# Patient Record
Sex: MALE | Race: WHITE | URBAN - METROPOLITAN AREA
[De-identification: names, ages, dates, MRNs, and addresses within clinical notes are randomized per-mention and may not be internally consistent; named-entity substitution may affect disease eponyms.]

---
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
## 2018-04-24 ENCOUNTER — OFFICE VISIT (OUTPATIENT)
Dept: FAMILY MEDICINE CLINIC | Age: 18
End: 2018-04-24

## 2018-04-24 VITALS
BODY MASS INDEX: 18.98 KG/M2 | HEART RATE: 78 BPM | HEIGHT: 71 IN | SYSTOLIC BLOOD PRESSURE: 122 MMHG | OXYGEN SATURATION: 97 % | WEIGHT: 135.6 LBS | RESPIRATION RATE: 16 BRPM | DIASTOLIC BLOOD PRESSURE: 73 MMHG | TEMPERATURE: 98.6 F

## 2018-04-24 DIAGNOSIS — R55 NEAR SYNCOPE: Primary | ICD-10-CM

## 2018-04-24 DIAGNOSIS — F42.9 OBSESSIVE-COMPULSIVE DISORDER WITH GOOD OR FAIR INSIGHT: Chronic | ICD-10-CM

## 2018-04-24 NOTE — PROGRESS NOTES
HISTORY OF PRESENT ILLNESS  HPI  Umair Clark is a 21 y.o. Male with a history of OCD, who presents at the office today with his mother for symptoms of syncope. Pt's mother notes that he has had dizziness within the past week and felt like he would pass out. Pt notes that the dizziness started on 4/20 while driving from work for lunch break that lasted 1 minute. Pt admits that he did not eat anything that day. Pt had another episode on 4/21 while into work. For both episodes he mentions having to catch his breath but notes no other symptoms. He denies any headaches. Pt states that he does not eat a lot for breakfast. Both pt and mother note that he does not eat a lot. Pt states that his weight has been stable through the year. Pt is not taking anything that might cause dizziness. Pt is at work from 2-11 and notes that he goes to urinate twice during that time. Pt's mother notes that she has had similar symptoms when she stood up that were due to decreased BP. Past Medical History:   Diagnosis Date    Obsessive-compulsive disorder with good or fair insight 12/22/2016    OCD (obsessive compulsive disorder)      History reviewed. No pertinent surgical history. No current outpatient prescriptions on file prior to visit. No current facility-administered medications on file prior to visit.       No Known Allergies  Family History   Problem Relation Age of Onset    No Known Problems Mother     Diabetes Father     No Known Problems Brother      Social History     Social History    Marital status: SINGLE     Spouse name: N/A    Number of children: N/A    Years of education: N/A     Social History Main Topics    Smoking status: Current Every Day Smoker     Years: 1.00    Smokeless tobacco: Never Used      Comment: Vap    Alcohol use No    Drug use: No    Sexual activity: Not Asked     Other Topics Concern    None     Social History Narrative             Review of Systems   Constitutional: Negative for chills, diaphoresis, fever, malaise/fatigue and weight loss. Loss of consciousness   Eyes: Negative for blurred vision, double vision, pain and redness. Respiratory: Negative for cough, shortness of breath and wheezing. Cardiovascular: Negative for chest pain, palpitations, orthopnea, claudication, leg swelling and PND. Skin: Negative for itching and rash. Neurological: Negative for dizziness, tingling, tremors, sensory change, speech change, focal weakness, seizures, loss of consciousness, weakness and headaches. No results found for this or any previous visit. Physical Exam  Visit Vitals    /73 (BP 1 Location: Left arm, BP Patient Position: Sitting)    Pulse 78    Temp 98.6 °F (37 °C) (Oral)    Resp 16    Ht 5' 10.5\" (1.791 m)    Wt 135 lb 9.6 oz (61.5 kg)    SpO2 97%    BMI 19.18 kg/m2     No orthostatic changes    Head: Normocephalic, without obvious abnormality, atraumatic  Eyes: conjunctivae/corneas clear. PERRL, EOM's intact. Neck: supple, symmetrical, trachea midline, no adenopathy, thyroid: not enlarged, symmetric, no tenderness/mass/nodules, no carotid bruit and no JVD  Lungs: clear to auscultation bilaterally  Heart: regular rate and rhythm, S1, S2 normal, no murmur, click, rub or gallop. No ectopy after listening for 3-4 minutes. Extremities: extremities normal, atraumatic, no cyanosis or edema  Pulses: 2+ and symmetric  Lymph nodes: Cervical, supraclavicular, and axillary nodes normal.  Neurologic: Grossly normal      ASSESSMENT and PLAN    ICD-10-CM ICD-9-CM    1. Near syncope R55 780.2    2. Obsessive-compulsive disorder with good or fair insight F42.9 300.3      Diagnoses and all orders for this visit:    1. Near syncope    2. Obsessive-compulsive disorder with good or fair insight      Follow-up Disposition:  Return if symptoms worsen or fail to improve.        reviewed medications and side effects in detail  Please call my office if there are any questions- [phone]. Discussed expected course/resolution/complications of diagnosis in detail with patient. Medication risks/benefits/costs/interactions/alternatives discussed with patient. Pt was given an after visit summary which includes diagnoses, current medications & vitals. Pt expressed understanding with the diagnosis and plan. Patient to call if no better in 3 -4 days and prn new problems. Total 25 minutes,60 % counseling re: Because this came on two times when he was sitting, it is unlikely to be caused by orthostatic BP changes. Possible that this is due to an arrhythmia and because he does consume a fair amount of caffeine I suggested that he cut back on the caffeine from his 2-3 Dr. Isaac Stage a day to 1. If he has no problems at all for the next month then he should try the Dr. Diandra Webber again at a normal level for him and see if his symptoms return. If his symptoms remain unchanged by his caffeine intake then we will set him for a 24 hour Holter monitor  Also, discussed symptoms of concern that were noted today in the note above, treatment options( including doing nothing), when to follow up before recommended time frame. Also, answered all questions. Long discussion of the causes of syncope or near syncope and that further testing is not indicated at the present time, but would be if it occurs again or he starts having increased palpitations, etc.     He stopped his Zoloft back in January and is functioning OK without it (he is taking it only for OCD, nothing else). He denies any excessive stress related to anything, especially his job or at home, socially , etc. He notes he slept well the night before each of these episodes. This document was written by Waqar Pederson, as dictated by Charley Rod MD.  I have reviewed and agree with the above note and have made corrections where appropriate Nilo John M.D.

## 2018-04-24 NOTE — LETTER
NOTIFICATION RETURN TO WORK / SCHOOL 
 
4/24/2018 5:24 PM 
 
Mr. Juan Salazar 
[address] [place] [address] To Whom It May Concern: 
 
Juan Salazar is currently under the care of [facility]. He will return to work/school on: Wednesday, April 25, 2018 If there are questions or concerns please have the patient contact our office.  
 
 
 
Sincerely, 
 
 
Jose E Townsend MD

## 2018-04-24 NOTE — PROGRESS NOTES
Chief Complaint   Patient presents with    Loss of Consciousness     Patient states that he almost passed out twice while driving. Vision became disoriented, tingling through body, pressure in head. First episode lasted 1 minute and second episode lasted 15 seconds. 1. Have you been to the ER, urgent care clinic since your last visit? Hospitalized since your last visit? No    2. Have you seen or consulted any other health care providers outside of the [address] [place] [address] Michael since your last visit? Include any pap smears or colon screening.  No

## 2019-01-14 ENCOUNTER — OFFICE VISIT (OUTPATIENT)
Dept: FAMILY MEDICINE CLINIC | Age: 19
End: 2019-01-14

## 2019-01-14 VITALS
HEIGHT: 71 IN | SYSTOLIC BLOOD PRESSURE: 120 MMHG | HEART RATE: 75 BPM | BODY MASS INDEX: 17.36 KG/M2 | WEIGHT: 124 LBS | OXYGEN SATURATION: 97 % | DIASTOLIC BLOOD PRESSURE: 77 MMHG | RESPIRATION RATE: 18 BRPM | TEMPERATURE: 98.8 F

## 2019-01-14 DIAGNOSIS — R59.9 LYMPH NODES ENLARGED: Primary | ICD-10-CM

## 2019-01-14 DIAGNOSIS — Z23 ENCOUNTER FOR IMMUNIZATION: ICD-10-CM

## 2019-01-14 DIAGNOSIS — R63.6 LOW WEIGHT: ICD-10-CM

## 2019-01-14 RX ORDER — IBUPROFEN 600 MG/1
600 TABLET ORAL
Qty: 60 TAB | Refills: 0 | Status: SHIPPED | OUTPATIENT
Start: 2019-01-14 | End: 2019-03-11

## 2019-01-14 NOTE — PROGRESS NOTES
HISTORY OF PRESENT ILLNESS Yojana Lazo 19 y.o. male  presents to the office today for evaluation of a swollen lymph node. Blood pressure 120/77, pulse 75, temperature 98.8 °F (37.1 °C), temperature source Oral, resp. rate 18, height 5' 10.5\" (1.791 m), weight 124 lb (56.2 kg), SpO2 97 %. Body mass index is 17.54 kg/m². HPI Swollen Lymph node: Pt reports he has had two notable lymph nodes BL on his groin. He noticed about a week ago that the node on the right side is larger than the node on the left side. Pt denies F/C, fatigue, infections, or weight loss. He endorses cold and sweaty feet occasionally while sleeping. Pt reports his weight has been stable and he has always been unerwight. Health Maintenance: Pt denies having flu shot and agrees to get it today. No Known Allergies Past Medical History:  
Diagnosis Date  Obsessive-compulsive disorder with good or fair insight 12/22/2016  OCD (obsessive compulsive disorder) History reviewed. No pertinent surgical history. Family History Problem Relation Age of Onset  No Known Problems Mother  Diabetes Father  No Known Problems Brother Social History Tobacco Use  Smoking status: Current Every Day Smoker Years: 1.00  Smokeless tobacco: Never Used  Tobacco comment: Vap Substance Use Topics  Alcohol use: No  
  
 
Review of Systems Constitutional: Negative for malaise/fatigue. HENT: Negative for congestion. Eyes: Negative for blurred vision and pain. Respiratory: Negative for cough and shortness of breath. Cardiovascular: Negative for chest pain and palpitations. Gastrointestinal: Negative for abdominal pain and heartburn. Genitourinary: Negative for frequency and urgency. Musculoskeletal: Negative for joint pain and myalgias. Neurological: Negative for dizziness, tingling, sensory change, weakness and headaches. Psychiatric/Behavioral: Negative for depression, memory loss and substance abuse. Physical Exam  
Constitutional: He is oriented to person, place, and time. He appears well-developed and well-nourished. HENT:  
Head: Normocephalic and atraumatic. Right Ear: External ear normal.  
Left Ear: External ear normal.  
Nose: Nose normal.  
Mouth/Throat: Oropharynx is clear and moist.  
Eyes: Conjunctivae and EOM are normal.  
Neck: Normal range of motion. Neck supple. Cardiovascular: Normal rate, regular rhythm, normal heart sounds and intact distal pulses. Pulmonary/Chest: Effort normal and breath sounds normal.  
Abdominal: Soft. Bowel sounds are normal.  
Genitourinary: Testes normal.  
Musculoskeletal: Normal range of motion. Lymphadenopathy:  
     Head (right side): No submental, no submandibular, no tonsillar, no preauricular, no posterior auricular and no occipital adenopathy present. Head (left side): No submental, no submandibular, no tonsillar, no preauricular, no posterior auricular and no occipital adenopathy present. He has no cervical adenopathy. He has no axillary adenopathy. Right: Inguinal adenopathy present. No supraclavicular and no epitrochlear adenopathy present. Left: Inguinal adenopathy present. No supraclavicular and no epitrochlear adenopathy present. Neurological: He is alert and oriented to person, place, and time. Skin: Skin is warm and dry. Psychiatric: He has a normal mood and affect. His behavior is normal. Judgment and thought content normal.  
Nursing note and vitals reviewed. ASSESSMENT and PLAN Diagnoses and all orders for this visit: 1. Lymph nodes enlarged -     METABOLIC PANEL, COMPREHENSIVE 
-     CBC WITH AUTOMATED DIFF 
-     SED RATE (ESR) 
-     US EXT NONVAS RT LTD; Future -     ibuprofen (MOTRIN) 600 mg tablet; Take 1 Tab by mouth every eight (8) hours as needed for Pain. -     Unclear cause, we will check labs and do an ultrasound. Advised if the node should get large or painful to return immediatly 2. Low weight 
     -       Unclear as to cause, Pt my be historically of low weight. I advised Pt to continue to monitor his weight. 3. Encounter for immunization 
-     INFLUENZA VIRUS VAC QUAD,SPLIT,PRESV FREE SYRINGE I 
-     Administered Flu vaccine today in office Follow-up Disposition: 
Return in about 2 weeks (around 1/28/2019) for enlared lymph nodes. Medication risks/benefits/costs/interactions/alternatives discussed with patient. Advised patient to call back or return to office if symptoms worsen/change/persist.  If patient cannot reach us or should anything more severe/urgent arise he/she should proceed directly to the nearest emergency department. Discussed expected course/resolution/complications of diagnosis in detail with patient. Patient given a written after visit summary which includes her diagnoses, current medications and vitals. Patient expressed understanding with the diagnosis and plan. This document was written by Khushi Love, as dictated by Dr. Haylie Burgess MD.  
5:11 PM - 5:29 PM 
Total time spent with the patient 18 minutes, greater than 50% of time spent counseling patient.

## 2019-01-14 NOTE — PATIENT INSTRUCTIONS
Swollen Lymph Nodes: Care Instructions Your Care Instructions Lymph nodes are small, bean-shaped glands throughout the body. They help your body fight germs and infections. Lymph nodes often swell when there is a problem such as an injury, infection, or tumor. · The nodes in your neck, under your chin, or behind your ears may swell when you have a cold or sore throat. · An injury or infection in a leg or foot can make the nodes in your groin swell. · Sometimes medicine can make lymph nodes swell, but this is rare. Treatment depends on what caused your nodes to swell. Usually the nodes return to normal size without a problem. Follow-up care is a key part of your treatment and safety. Be sure to make and go to all appointments, and call your doctor if you are having problems. It's also a good idea to know your test results and keep a list of the medicines you take. How can you care for yourself at home? · Take your medicines exactly as prescribed. Call your doctor if you think you are having a problem with your medicine. · Avoid irritation. ? Do not squeeze or pick at the lump. ? Do not stick a needle in it. · Prevent infection. Do not squeeze, drain, or puncture a painful lump. Doing this can irritate or inflame the lump, push any existing infection deeper into the skin, or cause severe bleeding. · Get extra rest. Slow down just a little from your usual routine. · Drink plenty of fluids, enough so that your urine is light yellow or clear like water. If you have kidney, heart, or liver disease and have to limit fluids, talk with your doctor before you increase the amount of fluids you drink. · Take an over-the-counter pain medicine, such as acetaminophen (Tylenol), ibuprofen (Advil, Motrin), or naproxen (Aleve). Read and follow all instructions on the label. · Do not take two or more pain medicines at the same time unless the doctor told you to.  Many pain medicines have acetaminophen, which is Tylenol. Too much acetaminophen (Tylenol) can be harmful. When should you call for help? Call your doctor now or seek immediate medical care if: 
  · You have worse symptoms of infection, such as: 
? Increased pain, swelling, warmth, or redness. ? Red streaks leading from the area. ? Pus draining from the area. ? A fever.  
 Watch closely for changes in your health, and be sure to contact your doctor if: 
  · Your lymph nodes do not get smaller or do not return to normal.  
  · You do not get better as expected. Where can you learn more? Go to http://kimberly-todd.info/. Enter H891 in the search box to learn more about \"Swollen Lymph Nodes: Care Instructions. \" Current as of: November 18, 2017 Content Version: 11.8 © 0404-8751 [employer]. Care instructions adapted under license by [employer] (which disclaims liability or warranty for this information). If you have questions about a medical condition or this instruction, always ask your healthcare professional. [place] [address] any warranty or liability for your use of this information.

## 2019-01-14 NOTE — PROGRESS NOTES
Chief Complaint Patient presents with  Mass c/o swollen gland, lymph node right x 2 weeks , tender at times Reviewed Record in preparation for visit and have obtained necessary documentation. Identified pt with two pt identifiers (Name @ ) Health Maintenance Due Topic  DTaP/Tdap/Td series (1 - Tdap)  HPV Age 9Y-34Y (1 - Male 3-dose series)  Pneumococcal 19-64 Medium Risk (1 of 1 - PPSV23)  Influenza Age 5 to Adult 1. Have you been to the ER, urgent care clinic since your last visit? Hospitalized since your last visit no 
 
2. Have you seen or consulted any other health care providers outside of the [address] [place] [address] since your last visit? Include any pap smears or colon screening  no

## 2019-01-15 LAB
ALBUMIN SERPL-MCNC: 5.4 G/DL (ref 3.5–5.5)
ALBUMIN/GLOB SERPL: 2.3 {RATIO} (ref 1.2–2.2)
ALP SERPL-CCNC: 79 IU/L (ref 39–117)
ALT SERPL-CCNC: 10 IU/L (ref 0–44)
AST SERPL-CCNC: 17 IU/L (ref 0–40)
BASOPHILS # BLD AUTO: 0 X10E3/UL (ref 0–0.2)
BASOPHILS NFR BLD AUTO: 0 %
BILIRUB SERPL-MCNC: 1.1 MG/DL (ref 0–1.2)
BUN SERPL-MCNC: 10 MG/DL (ref 6–20)
BUN/CREAT SERPL: 13 (ref 9–20)
CALCIUM SERPL-MCNC: 10.6 MG/DL (ref 8.7–10.2)
CHLORIDE SERPL-SCNC: 102 MMOL/L (ref 96–106)
CO2 SERPL-SCNC: 22 MMOL/L (ref 20–29)
CREAT SERPL-MCNC: 0.77 MG/DL (ref 0.76–1.27)
EOSINOPHIL # BLD AUTO: 0.1 X10E3/UL (ref 0–0.4)
EOSINOPHIL NFR BLD AUTO: 1 %
ERYTHROCYTE [DISTWIDTH] IN BLOOD BY AUTOMATED COUNT: 13.3 % (ref 12.3–15.4)
ERYTHROCYTE [SEDIMENTATION RATE] IN BLOOD BY WESTERGREN METHOD: 2 MM/HR (ref 0–15)
GLOBULIN SER CALC-MCNC: 2.4 G/DL (ref 1.5–4.5)
GLUCOSE SERPL-MCNC: 90 MG/DL (ref 65–99)
HCT VFR BLD AUTO: 48.2 % (ref 37.5–51)
HGB BLD-MCNC: 16.9 G/DL (ref 13–17.7)
IMM GRANULOCYTES # BLD AUTO: 0 X10E3/UL (ref 0–0.1)
IMM GRANULOCYTES NFR BLD AUTO: 0 %
LYMPHOCYTES # BLD AUTO: 1.9 X10E3/UL (ref 0.7–3.1)
LYMPHOCYTES NFR BLD AUTO: 36 %
MCH RBC QN AUTO: 32.4 PG (ref 26.6–33)
MCHC RBC AUTO-ENTMCNC: 35.1 G/DL (ref 31.5–35.7)
MCV RBC AUTO: 92 FL (ref 79–97)
MONOCYTES # BLD AUTO: 0.5 X10E3/UL (ref 0.1–0.9)
MONOCYTES NFR BLD AUTO: 9 %
NEUTROPHILS # BLD AUTO: 2.8 X10E3/UL (ref 1.4–7)
NEUTROPHILS NFR BLD AUTO: 54 %
PLATELET # BLD AUTO: 317 X10E3/UL (ref 150–379)
POTASSIUM SERPL-SCNC: 4.4 MMOL/L (ref 3.5–5.2)
PROT SERPL-MCNC: 7.8 G/DL (ref 6–8.5)
RBC # BLD AUTO: 5.22 X10E6/UL (ref 4.14–5.8)
SODIUM SERPL-SCNC: 143 MMOL/L (ref 134–144)
WBC # BLD AUTO: 5.3 X10E3/UL (ref 3.4–10.8)

## 2019-01-16 ENCOUNTER — HOSPITAL ENCOUNTER (OUTPATIENT)
Dept: ULTRASOUND IMAGING | Age: 19
Discharge: HOME | End: 2019-01-16
Payer: COMMERCIAL

## 2019-01-16 DIAGNOSIS — R59.9 LYMPH NODES ENLARGED: ICD-10-CM

## 2019-01-16 PROCEDURE — 76882 US LMTD JT/FCL EVL NVASC XTR: CPT

## 2019-01-17 ENCOUNTER — TELEPHONE (OUTPATIENT)
Dept: FAMILY MEDICINE CLINIC | Age: 19
End: 2019-01-17

## 2019-01-17 NOTE — TELEPHONE ENCOUNTER
Notes recorded by Shekhar Rubin LPN on 8/06/7802 at 3:70 PM EST  Called and patient aware of results. Has f/u apt on 01/28/19. To call immediately if painful or getting larger.

## 2019-01-17 NOTE — PROGRESS NOTES
Please inform pt that he needs to RTO if the swelling does not go down. Impression    IMPRESSION:  1. Palpable adenopathy corresponding to normal appearing lymph nodes. Clinical  management is suggested.  We still have to make sure sure it goes down in size follow up in 2 weeks

## 2019-01-17 NOTE — TELEPHONE ENCOUNTER
Pt. called in today requesting a call back in regards to his x-rays results.      Best call #[phone]

## 2019-01-17 NOTE — PROGRESS NOTES
Called and patient aware of results. Has f/u apt on 01/28/19. To call immediately if painful or getting larger.

## 2019-01-28 ENCOUNTER — OFFICE VISIT (OUTPATIENT)
Dept: FAMILY MEDICINE CLINIC | Age: 19
End: 2019-01-28

## 2019-01-28 VITALS
RESPIRATION RATE: 20 BRPM | BODY MASS INDEX: 17.98 KG/M2 | HEIGHT: 71 IN | TEMPERATURE: 96.1 F | SYSTOLIC BLOOD PRESSURE: 122 MMHG | OXYGEN SATURATION: 94 % | WEIGHT: 128.4 LBS | HEART RATE: 72 BPM | DIASTOLIC BLOOD PRESSURE: 70 MMHG

## 2019-01-28 DIAGNOSIS — R59.9 ENLARGED LYMPH NODES: Primary | ICD-10-CM

## 2019-01-28 NOTE — PROGRESS NOTES
HISTORY OF PRESENT ILLNESS  Lupe Schwab 19 y.o. male  presents to the office today for f/u for enlarged lymph nodes. Blood pressure 122/70, pulse 72, temperature 96.1 °F (35.6 °C), temperature source Oral, resp. rate 20, height 5' 10.5\" (1.791 m), weight 128 lb 6.4 oz (58.2 kg), SpO2 94 %. Body mass index is 18.16 kg/m². Chief Complaint   Patient presents with    Mass     2 weeks follow up visit for enlarge lymph nodes, per patient it has gone down but just a little pain once in awhile      HPI  Enlarged Lymph Node: Pt states that his right inguinal lymph node is still notably larger than the left. He says the pain is slight and feels like pressure but the swelling has gone down since his last visit. The Pt's ultrasound results were normal (Palpable adenopathy, corresponding to normal appearing lymph nodes). Right inguinal lymph nodes were palpated and found to be larger than the left. Pt states he can sometimes feels the left lymph node, but not as much as the right. Pt denies F/C and says his weight has been constant. I referred the Pt to  for a biopsy. Current Outpatient Medications   Medication Sig Dispense Refill    ibuprofen (MOTRIN) 600 mg tablet Take 1 Tab by mouth every eight (8) hours as needed for Pain. 61 Tab 0     No Known Allergies  Past Medical History:   Diagnosis Date    Obsessive-compulsive disorder with good or fair insight 12/22/2016    OCD (obsessive compulsive disorder)      History reviewed. No pertinent surgical history. Family History   Problem Relation Age of Onset    No Known Problems Mother     Diabetes Father     No Known Problems Brother      Social History     Tobacco Use    Smoking status: Current Every Day Smoker     Years: 1.00    Smokeless tobacco: Never Used    Tobacco comment: Vap   Substance Use Topics    Alcohol use: No      Review of Systems   Constitutional: Negative for malaise/fatigue. Eyes: Negative for blurred vision.    Respiratory: Negative for shortness of breath. Cardiovascular: Negative for chest pain and leg swelling. Musculoskeletal: Negative. Neurological: Negative for dizziness and headaches. Physical Exam   Constitutional: He is oriented to person, place, and time. He appears well-developed and well-nourished. No distress. HENT:   Head: Normocephalic and atraumatic. Neck: Normal range of motion. Neck supple. Cardiovascular: Normal rate, regular rhythm, normal heart sounds and intact distal pulses. Exam reveals no gallop and no friction rub. No murmur heard. Pulmonary/Chest: Effort normal and breath sounds normal. No respiratory distress. He has no wheezes. He has no rales. He exhibits no tenderness. Abdominal: Soft. Bowel sounds are normal. He exhibits no distension. There is no tenderness. Musculoskeletal: Normal range of motion. He exhibits no edema. Lymphadenopathy:        Right: Inguinal adenopathy present. Right inguinal lymph nodes were palpated and found to be larger than the left   Neurological: He is alert and oriented to person, place, and time. Skin: He is not diaphoretic. Psychiatric: He has a normal mood and affect. His behavior is normal. Judgment and thought content normal.   Nursing note and vitals reviewed. ASSESSMENT and PLAN  Diagnoses and all orders for this visit:    1. Enlarged lymph nodes  Unlcear as to the cause of his lymph node enlargement, we need a second opinion. I referred the Pt to [address] [place] [address] for an evaluation.  -     REFERRAL TO GENERAL SURGERY    Follow-up Disposition:  Return in about 4 weeks (around 2/25/2019), or if symptoms worsen or fail to improve, for enlarged lymph node. Medication risks/benefits/costs/interactions/alternatives discussed with patient.   Advised patient to call back or return to office if symptoms worsen/change/persist.  If patient cannot reach us or should anything more severe/urgent arise he/she should proceed directly to the nearest emergency department. Discussed expected course/resolution/complications of diagnosis in detail with patient. Patient given a written after visit summary which includes her diagnoses, current medications and vitals. Patient expressed understanding with the diagnosis and plan. This document was written by Risa Ziegler, as dictated by Dr. Almas Harrison MD.   5:14 PM - 5:24 PM  Total time spent with the patient 10 minutes, greater than 50% of time spent counseling patient.

## 2019-01-28 NOTE — PATIENT INSTRUCTIONS
Swollen Lymph Nodes: Care Instructions  Your Care Instructions    Lymph nodes are small, bean-shaped glands throughout the body. They help your body fight germs and infections. Lymph nodes often swell when there is a problem such as an injury, infection, or tumor. · The nodes in your neck, under your chin, or behind your ears may swell when you have a cold or sore throat. · An injury or infection in a leg or foot can make the nodes in your groin swell. · Sometimes medicine can make lymph nodes swell, but this is rare. Treatment depends on what caused your nodes to swell. Usually the nodes return to normal size without a problem. Follow-up care is a key part of your treatment and safety. Be sure to make and go to all appointments, and call your doctor if you are having problems. It's also a good idea to know your test results and keep a list of the medicines you take. How can you care for yourself at home? · Take your medicines exactly as prescribed. Call your doctor if you think you are having a problem with your medicine. · Avoid irritation. ? Do not squeeze or pick at the lump. ? Do not stick a needle in it. · Prevent infection. Do not squeeze, drain, or puncture a painful lump. Doing this can irritate or inflame the lump, push any existing infection deeper into the skin, or cause severe bleeding. · Get extra rest. Slow down just a little from your usual routine. · Drink plenty of fluids, enough so that your urine is light yellow or clear like water. If you have kidney, heart, or liver disease and have to limit fluids, talk with your doctor before you increase the amount of fluids you drink. · Take an over-the-counter pain medicine, such as acetaminophen (Tylenol), ibuprofen (Advil, Motrin), or naproxen (Aleve). Read and follow all instructions on the label. · Do not take two or more pain medicines at the same time unless the doctor told you to.  Many pain medicines have acetaminophen, which is Tylenol. Too much acetaminophen (Tylenol) can be harmful. When should you call for help? Call your doctor now or seek immediate medical care if:    · You have worse symptoms of infection, such as:  ? Increased pain, swelling, warmth, or redness. ? Red streaks leading from the area. ? Pus draining from the area. ? A fever.    Watch closely for changes in your health, and be sure to contact your doctor if:    · Your lymph nodes do not get smaller or do not return to normal.     · You do not get better as expected. Where can you learn more? Go to http://kimberly-todd.info/. Enter J535 in the search box to learn more about \"Swollen Lymph Nodes: Care Instructions. \"  Current as of: July 30, 2018  Content Version: 11.9  © 9626-0192 [employer]. Care instructions adapted under license by [employer] (which disclaims liability or warranty for this information). If you have questions about a medical condition or this instruction, always ask your healthcare professional. [place] [address] any warranty or liability for your use of this information.

## 2019-01-28 NOTE — PROGRESS NOTES
Spoke to patient Identified pt with two pt identifiers (Name @ )    1. Have you been to the ER, urgent care clinic since your last visit? Hospitalized since your last visit? NO    2. Have you seen or consulted any other health care providers outside of the [address] [place] [address] since your last visit? Include any pap smears or colon screening.   NO    \"REVIEWED RECORD IN PREPARATION FOR VISIT AND HAVE OBTAINED NECESSARY DOCUMENTATION\"

## 2019-03-11 ENCOUNTER — OFFICE VISIT (OUTPATIENT)
Dept: URGENT CARE | Age: 19
End: 2019-03-11

## 2019-03-11 VITALS
DIASTOLIC BLOOD PRESSURE: 76 MMHG | TEMPERATURE: 98.7 F | HEART RATE: 69 BPM | BODY MASS INDEX: 18.16 KG/M2 | RESPIRATION RATE: 14 BRPM | OXYGEN SATURATION: 99 % | HEIGHT: 71 IN | SYSTOLIC BLOOD PRESSURE: 152 MMHG

## 2019-03-11 DIAGNOSIS — N45.1 EPIDIDYMITIS, RIGHT: Primary | ICD-10-CM

## 2019-03-11 LAB
BILIRUB UR QL STRIP: NEGATIVE
GLUCOSE UR-MCNC: NEGATIVE MG/DL
KETONES P FAST UR STRIP-MCNC: NEGATIVE MG/DL
PH UR STRIP: 5 [PH] (ref 4.6–8)
PROT UR QL STRIP: NEGATIVE
SP GR UR STRIP: 1.03 (ref 1–1.03)
UA UROBILINOGEN AMB POC: NORMAL (ref 0.2–1)
URINALYSIS CLARITY POC: NORMAL
URINALYSIS COLOR POC: NORMAL
URINE BLOOD POC: NEGATIVE
URINE LEUKOCYTES POC: NEGATIVE
URINE NITRITES POC: NEGATIVE

## 2019-03-11 RX ORDER — CEPHALEXIN 500 MG/1
500 CAPSULE ORAL 4 TIMES DAILY
Qty: 28 CAP | Refills: 0 | Status: SHIPPED | OUTPATIENT
Start: 2019-03-11 | End: 2019-03-18

## 2019-03-11 NOTE — LETTER
[address] [address] [place] [address] 
[phone] Work/School Note Date: 3/11/2019 To Whom It May concern: 
 
Anand Marquez was seen and treated today in the urgent care center by the following provider: Gustavo Moscoso MD 
 
Anand Marquez may return to work on 3/11/19 Sincerely, Isaiah Shepherd

## 2019-03-11 NOTE — PROGRESS NOTES
Testicle Swelling   The history is provided by the patient. This is a new problem. The current episode started more than 2 days ago. The problem occurs daily. The problem has not changed since onset. Primary symptoms include swelling (mild on rt testicle with local pain - sore to pressure). Pertinent negatives include no genital lesions, no penile discharge, no penile pain, no testicular mass and no scrotal pain. There has been no fever. He has tried nothing for the symptoms. Sexual activity: non-contributory. Past Medical History:   Diagnosis Date    Obsessive-compulsive disorder with good or fair insight 12/22/2016    OCD (obsessive compulsive disorder)         No past surgical history on file. Family History   Problem Relation Age of Onset    No Known Problems Mother     Diabetes Father     No Known Problems Brother         Social History     Socioeconomic History    Marital status: SINGLE     Spouse name: Not on file    Number of children: Not on file    Years of education: Not on file    Highest education level: Not on file   Social Needs    Financial resource strain: Not on file    Food insecurity - worry: Not on file    Food insecurity - inability: Not on file   [employer] needs - medical: Not on file   [employer] needs - non-medical: Not on file   Occupational History    Not on file   Tobacco Use    Smoking status: Current Every Day Smoker     Years: 1.00    Smokeless tobacco: Never Used    Tobacco comment: Vap   Substance and Sexual Activity    Alcohol use: No    Drug use: No    Sexual activity: Not on file   Other Topics Concern    Not on file   Social History Narrative    Not on file                ALLERGIES: Patient has no known allergies. Review of Systems   Genitourinary: Negative for penile discharge and penile pain. All other systems reviewed and are negative.       Vitals:    03/11/19 1049   BP: 152/76   Pulse: 69   Resp: 14   Temp: 98.7 °F (37.1 °C)   SpO2: 99%   Height: 5' 10.5\" (1.791 m)       Physical Exam   Abdominal: Normal appearance. He exhibits no shifting dullness and no pulsatile liver. Hernia confirmed negative in the right inguinal area and confirmed negative in the left inguinal area. Genitourinary: Penis normal. Swelling (mild on rt testicle with local pain - sore to pressure) present in the scrotum and/or testes. Right testis shows tenderness (mild on upper pole of testicle). Right testis shows no mass and no swelling. Circumcised. Lymphadenopathy:        Right: No inguinal adenopathy present. Left: No inguinal adenopathy present. Nursing note and vitals reviewed. MDM    Procedures      ICD-10-CM ICD-9-CM    1. Epididymitis, right N45.1 604.90 AMB POC URINALYSIS DIP STICK AUTO W/O MICRO     Use testicular support  Follow with urologist if not resolved    Go to ED if more swelling/ pain   Medications Ordered Today   Medications    cephALEXin (KEFLEX) 500 mg capsule     Sig: Take 1 Cap by mouth four (4) times daily for 7 days. Dispense:  28 Cap     Refill:  0     Results for orders placed or performed in visit on 03/11/19   AMB POC URINALYSIS DIP STICK AUTO W/O MICRO   Result Value Ref Range    Color (UA POC)      Clarity (UA POC)      Glucose (UA POC) Negative Negative    Bilirubin (UA POC) Negative Negative    Ketones (UA POC) Negative Negative    Specific gravity (UA POC) 1.030 1.001 - 1.035    Blood (UA POC) Negative Negative    pH (UA POC) 5.0 4.6 - 8.0    Protein (UA POC) Negative Negative    Urobilinogen (UA POC) 0.2 mg/dL 0.2 - 1    Nitrites (UA POC) Negative Negative    Leukocyte esterase (UA POC) Negative Negative     The patients condition was discussed with the patient and they understand. The patient is to follow up with primary care doctor. If signs and symptoms become worse the pt is to go to the ER. The patient is to take medications as prescribed.

## 2019-03-11 NOTE — PATIENT INSTRUCTIONS
Epididymitis and Orchitis: Care Instructions  Your Care Instructions    Epididymitis is pain and swelling of the tube that is attached to each testicle. This tube is called the epididymis. Orchitis is pain and swelling of the testicle. Infection with bacteria often causes these conditions. Sexually transmitted infections (STIs) also can cause both conditions. This is often the case in men younger than 28. Other causes in boys and older men are infections from surgery or having a catheter that drains urine. The mumps virus also can cause orchitis. Anti-inflammatory or pain medicines can help with the pain. Antibiotics are used if the problem is caused by bacteria. They are not used if a virus is the cause. Your testicle may stay swollen for many days or even a few weeks. The doctor has checked you carefully, but problems can develop later. If you notice any problems or new symptoms, get medical treatment right away. Follow-up care is a key part of your treatment and safety. Be sure to make and go to all appointments, and call your doctor if you are having problems. It's also a good idea to know your test results and keep a list of the medicines you take. How can you care for yourself at home? · If your doctor prescribed antibiotics, take them as directed. Do not stop taking them just because you feel better. You need to take the full course of antibiotics. · Ask your doctor if you can take an over-the-counter pain medicine, such as acetaminophen (Tylenol), ibuprofen (Advil, Motrin), or naproxen (Aleve). Be safe with medicines. Read and follow all instructions on the label. · Limit your activity to what is comfortable. · Wear snug underwear or an athletic supporter. This can help reduce pain. · Apply either cold or heat to the swollen area. Use the one that works best for your pain. Sitting in a warm bath for 15 minutes twice a day will help reduce the swelling more quickly.   · If you have been told that an STI may have caused your condition, do not have sex until your doctor says it is safe. This will prevent spreading the infection. Tell your sex partner or partners that they need to be checked. They may need treatment. When should you call for help? Call your doctor now or seek immediate medical care if:    · Your pain gets worse.     · You have a new or higher fever.     · You have new or more swelling of your testicle.     · You have new belly pain, or your pain gets worse.    Watch closely for changes in your health, and be sure to contact your doctor if:    · You do not get better as expected. Where can you learn more? Go to http://kimberly-todd.info/. Enter S360 in the search box to learn more about \"Epididymitis and Orchitis: Care Instructions. \"  Current as of: March 20, 2018  Content Version: 11.9  © 5554-8593 [employer]. Care instructions adapted under license by "[employer]" (which disclaims liability or warranty for this information). If you have questions about a medical condition or this instruction, always ask your healthcare professional. [address] any warranty or liability for your use of this information.

## 2019-03-13 ENCOUNTER — OFFICE VISIT (OUTPATIENT)
Dept: FAMILY MEDICINE CLINIC | Age: 19
End: 2019-03-13

## 2019-03-13 VITALS
HEART RATE: 68 BPM | WEIGHT: 129 LBS | RESPIRATION RATE: 18 BRPM | TEMPERATURE: 98 F | HEIGHT: 70 IN | BODY MASS INDEX: 18.47 KG/M2 | DIASTOLIC BLOOD PRESSURE: 80 MMHG | SYSTOLIC BLOOD PRESSURE: 123 MMHG | OXYGEN SATURATION: 97 %

## 2019-03-13 DIAGNOSIS — N45.1 EPIDIDYMITIS: ICD-10-CM

## 2019-03-13 DIAGNOSIS — N50.811 TESTICULAR PAIN, RIGHT: Primary | ICD-10-CM

## 2019-03-13 RX ORDER — DOXYCYCLINE 100 MG/1
100 CAPSULE ORAL 2 TIMES DAILY
COMMUNITY
End: 2019-07-19

## 2019-03-13 NOTE — PATIENT INSTRUCTIONS
Testicular Pain: Care Instructions  Your Care Instructions    Pain in the testicles can be caused by many things. These include an injury to your testicles, an infection, and testicular torsion. Injuries and genital problems most often happen during sports or recreational activities, at work, or in a fall. Pain caused by an injury usually goes away quickly. There is usually no long-term harm to your testicles. Infections that may cause pain include:  · An infection of the testicles. This is called orchitis. · An abscess in the scrotum or testicles. · Some sexually transmitted infections (STIs). · A swelling of the tube attached to a testicle. This swelling is called epididymitis. It can cause pain and is sometimes caused by an infection. Testicular torsion happens when a testicle twists on the spermatic cord. This cuts off the blood supply to the testicle. This is a serious condition that requires surgery. Follow-up care is a key part of your treatment and safety. Be sure to make and go to all appointments, and call your doctor if you are having problems. It's also a good idea to know your test results and keep a list of the medicines you take. How can you care for yourself at home? · Rest and protect your testicles and groin. Stop, change, or take a break from any activity that may be causing your pain or soreness. · Put ice or a cold pack on the area for 10 to 20 minutes at a time. Put a thin cloth between the ice and your skin. · Wear briefs, not boxers. Briefs help support the injured area. You can use a jock strap if it helps relieve your pain. · If your doctor prescribed antibiotics, take them as directed. Do not stop taking them just because you feel better. You need to take the full course of antibiotics. · Ask your doctor if you can take an over-the-counter pain medicine, such as acetaminophen (Tylenol), ibuprofen (Advil, Motrin), or naproxen (Aleve). Be safe with medicines.  Read and follow all instructions on the label. · If the doctor gave you a prescription medicine for pain, take it as prescribed. When should you call for help? Call your doctor now or seek immediate medical care if:    · You have severe or increasing pain.     · You notice a change in how your testicles look or are positioned in your scrotum.     · You notice new or worse swelling in your scrotum.     · You have symptoms of a urinary problem, such as a urinary tract infection. These may include:  ? Pain or burning when you urinate. ? A frequent need to urinate without being able to pass much urine. ? Pain in the flank, which is just below the rib cage and above the waist on either side of the back. ? Blood in your urine. ? A fever.    Watch closely for changes in your health, and be sure to contact your doctor if:    · You do not get better as expected. Where can you learn more? Go to http://kimberly-todd.info/. Enter Z496 in the search box to learn more about \"Testicular Pain: Care Instructions. \"  Current as of: March 20, 2018  Content Version: 11.9  © 8418-2548 [employer]. Care instructions adapted under license by [employer] (which disclaims liability or warranty for this information). If you have questions about a medical condition or this instruction, always ask your healthcare professional. [place] [address] any warranty or liability for your use of this information. Epididymitis and Orchitis: Care Instructions  Your Care Instructions    Epididymitis is pain and swelling of the tube that is attached to each testicle. This tube is called the epididymis. Orchitis is pain and swelling of the testicle. Infection with bacteria often causes these conditions. Sexually transmitted infections (STIs) also can cause both conditions. This is often the case in men younger than 28.  Other causes in boys and older men are infections from surgery or having a catheter that drains urine. The mumps virus also can cause orchitis. Anti-inflammatory or pain medicines can help with the pain. Antibiotics are used if the problem is caused by bacteria. They are not used if a virus is the cause. Your testicle may stay swollen for many days or even a few weeks. The doctor has checked you carefully, but problems can develop later. If you notice any problems or new symptoms, get medical treatment right away. Follow-up care is a key part of your treatment and safety. Be sure to make and go to all appointments, and call your doctor if you are having problems. It's also a good idea to know your test results and keep a list of the medicines you take. How can you care for yourself at home? · If your doctor prescribed antibiotics, take them as directed. Do not stop taking them just because you feel better. You need to take the full course of antibiotics. · Ask your doctor if you can take an over-the-counter pain medicine, such as acetaminophen (Tylenol), ibuprofen (Advil, Motrin), or naproxen (Aleve). Be safe with medicines. Read and follow all instructions on the label. · Limit your activity to what is comfortable. · Wear snug underwear or an athletic supporter. This can help reduce pain. · Apply either cold or heat to the swollen area. Use the one that works best for your pain. Sitting in a warm bath for 15 minutes twice a day will help reduce the swelling more quickly. · If you have been told that an STI may have caused your condition, do not have sex until your doctor says it is safe. This will prevent spreading the infection. Tell your sex partner or partners that they need to be checked. They may need treatment. When should you call for help? Call your doctor now or seek immediate medical care if:    · Your pain gets worse.     · You have a new or higher fever.     · You have new or more swelling of your testicle.     · You have new belly pain, or your pain gets worse.  Watch closely for changes in your health, and be sure to contact your doctor if:    · You do not get better as expected. Where can you learn more? Go to http://kimberly-todd.info/. Enter S360 in the search box to learn more about \"Epididymitis and Orchitis: Care Instructions. \"  Current as of: March 20, 2018  Content Version: 11.9  © 7178-4294 [employer]. Care instructions adapted under license by [employer] (which disclaims liability or warranty for this information). If you have questions about a medical condition or this instruction, always ask your healthcare professional. [address] any warranty or liability for your use of this information.

## 2019-03-13 NOTE — PROGRESS NOTES
[facility] Note      Subjective:     Chief Complaint   Patient presents with    Other     swelling and dull pain x 4 days ago, hurts to use the bathroom      Jorge Alberto Mijares is a 19y.o. year old male who presents for evaluation of the following:      Right Testicular Pain  Onset 2 weeks ago, dull aching constant  In past 1 week rigth side tesicla swelling developed. Urgent care diagnosis of epididimytis  Next day urinary dribbling  Went to patient fisrst and told he was dyhydrated from havin the flu (diagnes 1 week ago)  Now urinating with normal stream  Ask me for second opinion and education  Reports a \"odd feeling\" during ejaculation since onset of symptoms  Sexually active with inconsistent condom use. [facility]        Review of Systems   Pertinent positives and negative per HPI. All other systems  reviewed are negative for a Comprehensive ROS (10+).        Past Medical History:   Diagnosis Date    Obsessive-compulsive disorder with good or fair insight 12/22/2016    OCD (obsessive compulsive disorder)         Social History     Socioeconomic History    Marital status: SINGLE     Spouse name: Not on file    Number of children: Not on file    Years of education: Not on file    Highest education level: Not on file   Social Needs    Financial resource strain: Not on file    Food insecurity - worry: Not on file    Food insecurity - inability: Not on file    Transportation needs - medical: Not on file   Prixtel needs - non-medical: Not on file   Occupational History    Not on file   Tobacco Use    Smoking status: Current Every Day Smoker     Years: 1.00    Smokeless tobacco: Never Used    Tobacco comment: Vap   Substance and Sexual Activity    Alcohol use: No    Drug use: No    Sexual activity: Yes     Partners: Female     Birth control/protection: Condom   Other Topics Concern    Not on file   Social History Narrative    Not on file       Current Outpatient Medications   Medication Sig    doxycycline (MONODOX) 100 mg capsule Take 100 mg by mouth two (2) times a day.  cephALEXin (KEFLEX) 500 mg capsule Take 1 Cap by mouth four (4) times daily for 7 days. No current facility-administered medications for this visit. Objective:     Vitals:    03/13/19 1604   BP: 123/80   Pulse: 68   Resp: 18   Temp: 98 °F (36.7 °C)   TempSrc: Oral   SpO2: 97%   Weight: 129 lb (58.5 kg)   Height: 5' 10\" (1.778 m)       Physical Examination:  General: Alert, cooperative, no distress, appears stated age. Eyes: Conjunctivae clear. Mouth/Throat: Lips, mucosa, and tongue normal.   Neck: Supple, symmetrical, trachea midline, no adenopathy. No thyroid enlargement/tenderness/nodules  Respiratory: Breathing comfortably, in no acute respiratory distress. Clear to auscultation bilaterally. Normal inspiratory and expiratory ratio. Cardiovascular: Regular rate and rhythm, S1, S2 normal, no murmur, click, rub or gallop. Extremities with no cyanosis or edema. Pulses 2+ and symmetric radial and DP   Abdomen: Soft, non-tender, non distended. Bowel sounds edmund  : Normal-appearing scrotum, no apparent swelling. Right spermatic cord and epididymis tenderness. Did not tolerate hernia examination. No testicular mass palpated. Left testicle nontender. Exam chaperones by MATTIE Truong  MSK: Extremities normal, atraumatic, no effusion. Gait steady and unassisted. Back symmetric, no curvature. ROM normal. No CVA tenderness. Skin: Skin color, texture, turgor normal. No rashes or lesions on exposed skin. Lymph nodes: Cervical, supraclavicular nodes normal.  Neurologic: CNII-XII intact.    Psychiatric: Appropriate affect      Office Visit on 03/11/2019   Component Date Value Ref Range Status    Glucose (UA POC) 03/11/2019 Negative  Negative Final    Bilirubin (UA POC) 03/11/2019 Negative  Negative Final    Ketones (UA POC) 03/11/2019 Negative  Negative Final    Specific gravity (UA POC) 03/11/2019 1.030  1.001 - 1.035 Final    Blood (UA POC) 03/11/2019 Negative  Negative Final    pH (UA POC) 03/11/2019 5.0  4.6 - 8.0 Final    Protein (UA POC) 03/11/2019 Negative  Negative Final    Urobilinogen (UA POC) 03/11/2019 0.2 mg/dL  0.2 - 1 Final    Nitrites (UA POC) 03/11/2019 Negative  Negative Final    Leukocyte esterase (UA POC) 03/11/2019 Negative  Negative Final   Office Visit on 01/14/2019   Component Date Value Ref Range Status    Glucose 01/14/2019 90  65 - 99 mg/dL Final    BUN 01/14/2019 10  6 - 20 mg/dL Final    Creatinine 01/14/2019 0.77  0.76 - 1.27 mg/dL Final    GFR est non-AA 01/14/2019 131  >59 mL/min/1.73 Final    GFR est AA 01/14/2019 151  >59 mL/min/1.73 Final    BUN/Creatinine ratio 01/14/2019 13  9 - 20 Final    Sodium 01/14/2019 143  134 - 144 mmol/L Final    Potassium 01/14/2019 4.4  3.5 - 5.2 mmol/L Final    Chloride 01/14/2019 102  96 - 106 mmol/L Final    CO2 01/14/2019 22  20 - 29 mmol/L Final    Calcium 01/14/2019 10.6* 8.7 - 10.2 mg/dL Final    Protein, total 01/14/2019 7.8  6.0 - 8.5 g/dL Final    Albumin 01/14/2019 5.4  3.5 - 5.5 g/dL Final    GLOBULIN, TOTAL 01/14/2019 2.4  1.5 - 4.5 g/dL Final    A-G Ratio 01/14/2019 2.3* 1.2 - 2.2 Final    Bilirubin, total 01/14/2019 1.1  0.0 - 1.2 mg/dL Final    Alk.  phosphatase 01/14/2019 79  39 - 117 IU/L Final    AST (SGOT) 01/14/2019 17  0 - 40 IU/L Final    ALT (SGPT) 01/14/2019 10  0 - 44 IU/L Final    WBC 01/14/2019 5.3  3.4 - 10.8 x10E3/uL Final    RBC 01/14/2019 5.22  4.14 - 5.80 x10E6/uL Final    HGB 01/14/2019 16.9  13.0 - 17.7 g/dL Final    HCT 01/14/2019 48.2  37.5 - 51.0 % Final    MCV 01/14/2019 92  79 - 97 fL Final    MCH 01/14/2019 32.4  26.6 - 33.0 pg Final    MCHC 01/14/2019 35.1  31.5 - 35.7 g/dL Final    RDW 01/14/2019 13.3  12.3 - 15.4 % Final    PLATELET 05/17/9972 550  150 - 379 x10E3/uL Final    NEUTROPHILS 01/14/2019 54  Not Estab. % Final  Lymphocytes 01/14/2019 36  Not Estab. % Final    MONOCYTES 01/14/2019 9  Not Estab. % Final    EOSINOPHILS 01/14/2019 1  Not Estab. % Final    BASOPHILS 01/14/2019 0  Not Estab. % Final    ABS. NEUTROPHILS 01/14/2019 2.8  1.4 - 7.0 x10E3/uL Final    Abs Lymphocytes 01/14/2019 1.9  0.7 - 3.1 x10E3/uL Final    ABS. MONOCYTES 01/14/2019 0.5  0.1 - 0.9 x10E3/uL Final    ABS. EOSINOPHILS 01/14/2019 0.1  0.0 - 0.4 x10E3/uL Final    ABS. BASOPHILS 01/14/2019 0.0  0.0 - 0.2 x10E3/uL Final    IMMATURE GRANULOCYTES 01/14/2019 0  Not Estab. % Final    ABS. IMM. GRANS. 01/14/2019 0.0  0.0 - 0.1 x10E3/uL Final    Sed rate (ESR) 01/14/2019 2  0 - 15 mm/hr Final           Assessment/ Plan:   Diagnoses and all orders for this visit:    1. Testicular pain, right  -     CT/NG/T.VAGINALIS AMPLIFICATION  -     URINALYSIS W/ RFLX MICROSCOPIC  -     CULTURE, URINE    2. Epididymitis        Symptoms consistent with epididymitis as previously diagnosed. Continue current doxycycline regimen. Urine testing to evaluate, inform patient these tests may be falsely negative given current antibody cues. Provided education and reassurance. Educated patient on red flag symptoms to warrant return to clinic or emergency room visit. I have discussed the diagnosis with the patient and the intended plan as seen in the above orders. The patient has been offered or received an after-visit summary and questions were answered concerning future plans. I have discussed medication side effects and warnings with the patient as well. Follow-up Disposition:  Return if symptoms worsen or fail to improve.       Signed,    Lora Metcalf MD  3/13/2019

## 2019-03-13 NOTE — PROGRESS NOTES
Chief Complaint   Patient presents with    Other     swelling and dull pain x 4 days ago, hurts to use the bathroom      1. Have you been to the ER, urgent care clinic since your last visit? Hospitalized since your last visit? [facility] swelling and pain in testicles. 2. Have you seen or consulted any other health care providers outside of the [address] [place] [address] since your last visit? Include any pap smears or colon screening. No  [facility] for swelling and pain in testicles.

## 2019-07-19 ENCOUNTER — OFFICE VISIT (OUTPATIENT)
Dept: FAMILY MEDICINE CLINIC | Age: 19
End: 2019-07-19

## 2019-07-19 VITALS
WEIGHT: 133.4 LBS | DIASTOLIC BLOOD PRESSURE: 60 MMHG | BODY MASS INDEX: 19.1 KG/M2 | TEMPERATURE: 97.5 F | SYSTOLIC BLOOD PRESSURE: 110 MMHG | HEIGHT: 70 IN | HEART RATE: 72 BPM | OXYGEN SATURATION: 97 % | RESPIRATION RATE: 18 BRPM

## 2019-07-19 DIAGNOSIS — R09.89 GLOBUS SYNDROME: ICD-10-CM

## 2019-07-19 DIAGNOSIS — F41.1 GENERALIZED ANXIETY DISORDER: Primary | ICD-10-CM

## 2019-07-19 RX ORDER — SERTRALINE HYDROCHLORIDE 50 MG/1
50 TABLET, FILM COATED ORAL DAILY
Qty: 30 TAB | Refills: 1 | Status: SHIPPED | OUTPATIENT
Start: 2019-07-19 | End: 2019-08-02

## 2019-07-19 NOTE — PATIENT INSTRUCTIONS
Learning About Generalized Anxiety Disorder  What is generalized anxiety disorder? We all worry. It's a normal part of life. But when you have generalized anxiety disorder, you worry about lots of things and have a hard time stopping your worry. This worry or anxiety interferes with your relationships, work, and life. What causes it? The cause is not known. But it may be passed down through families. What are the symptoms? You may feel anxious or worry most days about things like work, relationships, health, or money. You may worry about things that are unlikely to happen. You find it hard to stop or control the worry. Because you worry a lot and try hard to stop worrying, you may feel restless, tired, tense, or cranky. You may also find it hard to think or sleep. And you may have headaches or an upset stomach. How is it diagnosed? Your doctor will ask about your health and how often you worry or feel anxious. He or she may ask about other symptoms, like whether you:  · Feel restless. · Feel tired. · Have a hard time thinking or feel that your mind goes blank. · Feel cranky. · Have tense muscles. · Have sleep problems. A physical exam and tests can help make sure that your symptoms aren't caused by a different condition, such as a thyroid problem. How is it treated? Counseling and medicine can both work to treat anxiety. The two are often used along with lifestyle changes. Cognitive-behavioral therapy (CBT) is a type of counseling that's used to help treat anxiety. In CBT, you learn how to notice and replace thoughts that make you feel worried. It also can help you learn how to relax when you worry. Medicines can help. These medicines are often also used for depression. Selective serotonin reuptake inhibitors (SSRIs) are often tried first. But there are other medicines that your doctor may use. You may need to try a few medicines to find one that works well.   Many people feel better by getting regular exercise, eating healthy meals, and getting good sleep. Mindfulnessfocusing on things that happen in the present momentalso can help reduce your anxiety. What can you expect when you have it? Having anxiety can be upsetting. Some people might feel less worried and stressed after a couple of months of treatment. But for other people, it might take longer to feel better. Reaching out to people for help is important. Try not to isolate yourself. Let your family and friends help you. Find someone you can trust and confide in. Talk to that person. When you know what anxiety isand how you can get help for ityou can start to learn new ways of thinking. This can help you cope and work through your anxiety. Follow-up care is a key part of your treatment and safety. Be sure to make and go to all appointments, and call your doctor if you are having problems. It's also a good idea to know your test results and keep a list of the medicines you take. Where can you learn more? Go to http://kimberly-todd.info/. Enter G110 in the search box to learn more about \"Learning About Generalized Anxiety Disorder. \"  Current as of: September 11, 2018  Content Version: 11.9  © 8625-6407 [employer]. Care instructions adapted under license by [employer] (which disclaims liability or warranty for this information). If you have questions about a medical condition or this instruction, always ask your healthcare professional. [place] [address] any warranty or liability for your use of this information. Learning About a Lump in the Throat  What is a lump in the throat? The condition that people refer to as a \"lump in the throat\" is a feeling that an object is stuck in your throat. It's also called globus (say \"GLOH-bus\"). You don't really have anything stuck there. But the feeling is real, and it can be uncomfortable.   This feeling may be there all the time, or it may happen now and then. It's not painful. The lump is felt in the front of the throat. It may feel like it moves up and down when you swallow. It's not clear where this feeling in the throat comes from. Gastroesophageal reflux disease (GERD) may contribute to it. Reflux means that stomach acid and juices flow from the stomach back up into the tube that leads from the throat to the stomach. (This tube is called the esophagus.) The lump in the throat may have other causes. These include problems with swallowing, as well as muscle spasms in the esophagus. It may also be felt as a result of stress or an anxiety disorder. In some cases, the feeling goes away by itself over time. How is it treated? The doctor will first examine you to try to find the cause of the feeling of the lump in your throat. He or she may give you medicine to reduce stomach acid to see if it helps relieve the lump sensation. If anxiety is a factor, the doctor may work with you to address it. If you still feel like there's a lump in your throat, your doctor may give you a variety of other tests. He or she may examine your throat, neck, voice box (pharynx), and esophagus to see if you have any blockage. Your doctor may use a thin, flexible tube, called a scope, to look deep into your throat. This is called a laryngoscopy. Or you may have a swallowing study that uses X-rays to film your throat as you swallow. If the tests find a problem that can be treated, your doctor will treat you for it. You may have speech therapy to learn how to relax the muscles of your throat or swallow differently. And just finding out that there's no physical cause for the lump in your throat may help you feel better. How can you care for yourself at home? · Try not to clear your throat or swallow too often. · Have something to eat or drink. This may give you some relief for a while. · Try to relax and reduce stress.  Relaxation techniques such as deep breathing or meditation may help. Follow-up care is a key part of your treatment and safety. Be sure to make and go to all appointments, and call your doctor if you are having problems. It's also a good idea to know your test results and keep a list of the medicines you take. Where can you learn more? Go to http://kimberly-todd.info/. Enter J070 in the search box to learn more about \"Learning About a Lump in the Throat. \"  Current as of: March 27, 2018  Content Version: 11.9  © 8859-6609 [employer]. Care instructions adapted under license by [employer] (which disclaims liability or warranty for this information). If you have questions about a medical condition or this instruction, always ask your healthcare professional. [address] any warranty or liability for your use of this information.

## 2019-07-19 NOTE — PROGRESS NOTES
Chief Complaint   Patient presents with    Anxiety     x 3 months, feels like lump in throat     HISTORY OF PRESENT ILLNESS   HPI  My first time meeting this usual patient of Dr. Jil Rodriguez who presents initially complaining of sensation of a \"lump in his throat\" the past 3-4 months. Not daily and only intermittently throughout the day. Worse when he is anxious or stressed. Started working at new job at [employer] 4 months ago. Likes his job but wonders if he has been a bit more stressed since starting there. He was nervous when he started training there 4months ago and seems to have carried over. Also still feels the lump in throat and some anxiety even unrelated to work at times. He denies throat or neck pain, hoarseness, dysphagia, odynophagia, cough, post nasal drainage, throat scratchiness or itchiness, SOB, choking, throat closing, acid reflux, abdominal pain, nausea or vomiting. He has no trouble eating, drinking or swallowing. The sensation actually gets better when he drinks water or takes in a deep breath and relaxes. Feels it is all related to his nerves but wanted to be sure. He vapes/does E-cigs/jewels. Was up to ~ 4-6 pods a week but stopped altogether 1 week ago and has already noticed some improvement in the throat sensation. Wasn't sure if that was just \"all in his head\" or not. But since stopping the vaping, his anxiety has gotten worse. He does report being dx w/ OCD when testing during middle school. He recalls taking Zoloft for about 1-2 years which worked really well and finally stopped it because he was feeling so much better. He denies those ruminating/obsessive thoughts he used to have. But states he is generally an kun, nervous type person. CLARISA 7 7/19/2019   Over the past 2 weeks how often have you felt nervous, anxious, or on edge? 2   Over the past 2 weeks how often have you not been able to stop or control worrying?  3   In the past 2 weeks how often have you worried too much about different things? 3   Over the past 2 weeks, how often have you had trouble relaxing? 3   Over the last 2 weeks how often have you been so restless that it is hard to sit still? 2   Over the last 2 weeks how often have you been easily annoyed or irritable? 2   Over the last 2 weeks, how often have you felt afraid as if something awful might happen? 0   BSHSI AMB CLARISA-7 SCORE 15   If your score is 1 or more, how difficult have these made it for you to do your work, take care of things at home. or get along with people? Somewhat difficult          REVIEW OF SYMPTOMS   Review of Systems   Constitutional: Negative. Negative for chills, fever, malaise/fatigue and weight loss. HENT: Negative for sore throat. Respiratory: Negative. Negative for cough, shortness of breath and wheezing. Cardiovascular: Negative. Negative for chest pain. Gastrointestinal: Negative for abdominal pain, heartburn, nausea and vomiting. Neurological: Negative. Psychiatric/Behavioral: Negative for depression. The patient is nervous/anxious. The patient does not have insomnia. PROBLEM LIST/MEDICAL HISTORY     Problem List  Date Reviewed: 7/19/2019          Codes Class Noted    Obsessive-compulsive disorder with good or fair insight (Chronic) ICD-10-CM: F42.9  ICD-9-CM: 300.3  12/22/2016                  PAST SURGICAL HISTORY   History reviewed. No pertinent surgical history. MEDICATIONS     No current outpatient medications on file. No current facility-administered medications for this visit.            ALLERGIES   No Known Allergies       SOCIAL HISTORY     Social History     Socioeconomic History    Marital status: SINGLE     Spouse name: Not on file    Number of children: Not on file    Years of education: Not on file    Highest education level: Not on file   Occupational History    Occupation: Collections at [employer]   Tobacco Use    Smoking status: Current Some Day Smoker     Years: 1.00    Smokeless tobacco: Never Used    Tobacco comment: Vap/E-Cigs ~ a few pods a week   Substance and Sexual Activity    Alcohol use: No    Drug use: No    Sexual activity: Yes     Partners: Female     Birth control/protection: Condom   Other Topics Concern    Caffeine Concern Yes     Comment: 2 bottles of 20 oz Dr. Hank Rivero per day        IMMUNIZATIONS  Immunization History   Administered Date(s) Administered    Influenza Vaccine (Quad) PF 01/14/2019         FAMILY HISTORY     Family History   Problem Relation Age of Onset    No Known Problems Mother     Diabetes Father     No Known Problems Brother          VITALS     Visit Vitals  /60 (BP 1 Location: Right arm, BP Patient Position: Sitting)   Pulse 72   Temp 97.5 °F (36.4 °C) (Oral)   Resp 18   Ht 5' 10\" (1.778 m)   Wt 133 lb 6.4 oz (60.5 kg)   SpO2 97%   BMI 19.14 kg/m²          PHYSICAL EXAMINATION   Physical Exam   Constitutional: He is oriented to person, place, and time and well-developed, well-nourished, and in no distress. HENT:   Right Ear: Tympanic membrane normal.   Left Ear: Tympanic membrane normal.   Mouth/Throat: Oropharynx is clear and moist. No oropharyngeal exudate. Neck: Neck supple. No thyromegaly present. Cardiovascular: Regular rhythm and normal heart sounds. No murmur heard. Pulmonary/Chest: Effort normal and breath sounds normal.   Lymphadenopathy:     He has no cervical adenopathy. Neurological: He is alert and oriented to person, place, and time. Skin: Skin is warm and dry. Psychiatric: Judgment normal. His mood appears anxious. His affect is not inappropriate. He does not exhibit a depressed mood. He exhibits ordered thought content. Very pleasant young man, fidgety and bounces legs during most of the visit, slightly racy speech but very appropriate   Vitals reviewed. ASSESSMENT & PLAN       ICD-10-CM ICD-9-CM    1.  Generalized anxiety disorder (CLARISA& Score high) F41.1 300.02 sertraline (ZOLOFT) 50 mg tablet   2. 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   Counseling about globus syndrome at length w/ pt in office today to include nature of symptoms, potential causes, mgt  If the globus symptoms do not continue to improve, will have him see ENT  Otherwise follow up w/ PCP in ~ 6 weeks, sooner prn  Spent >50% of today's 30 min enc counseling/education

## 2019-07-19 NOTE — PROGRESS NOTES
Chief Complaint   Patient presents with    Anxiety     x 3 months, feels like lump in throat       1. Have you been to the ER, urgent care clinic since your last visit? Hospitalized since your last visit? No    2. Have you seen or consulted any other health care providers outside of the [address] [place] [address] since your last visit? Include any pap smears or colon screening.  No

## 2019-08-02 ENCOUNTER — OFFICE VISIT (OUTPATIENT)
Dept: FAMILY MEDICINE CLINIC | Age: 19
End: 2019-08-02

## 2019-08-02 VITALS
HEART RATE: 80 BPM | RESPIRATION RATE: 16 BRPM | SYSTOLIC BLOOD PRESSURE: 123 MMHG | HEIGHT: 70 IN | BODY MASS INDEX: 18.75 KG/M2 | WEIGHT: 131 LBS | TEMPERATURE: 97.6 F | DIASTOLIC BLOOD PRESSURE: 71 MMHG | OXYGEN SATURATION: 95 %

## 2019-08-02 DIAGNOSIS — K59.00 CONSTIPATION, UNSPECIFIED CONSTIPATION TYPE: ICD-10-CM

## 2019-08-02 DIAGNOSIS — N41.0 ACUTE PROSTATITIS: ICD-10-CM

## 2019-08-02 DIAGNOSIS — M54.42 ACUTE MIDLINE LOW BACK PAIN WITH LEFT-SIDED SCIATICA: Primary | ICD-10-CM

## 2019-08-02 RX ORDER — SULFAMETHOXAZOLE AND TRIMETHOPRIM 800; 160 MG/1; MG/1
TABLET ORAL
COMMUNITY
Start: 2019-07-27 | End: 2020-06-17 | Stop reason: ALTCHOICE

## 2019-08-02 RX ORDER — AZITHROMYCIN 500 MG/1
TABLET, FILM COATED ORAL
COMMUNITY
Start: 2019-07-26 | End: 2019-08-02

## 2019-08-02 NOTE — PROGRESS NOTES
Chief Complaint   Patient presents with    Numbness     left leg x 2 days     Constipation     x 2 days      1. Have you been to the ER, urgent care clinic since your last visit? Hospitalized since your last visit? Yes [facility] 1 week ago for prostatitis      2. Have you seen or consulted any other health care providers outside of the [address] [place] [address] since your last visit? Include any pap smears or colon screening.  No

## 2019-08-02 NOTE — PATIENT INSTRUCTIONS
Motrin 600-800 mg Q 6 hours for low back pain. Drink plenty of water. Complete antibiotic therapy   Take Miralax 1-2 times daily as needed for constipation. Low Back Pain: Exercises  Introduction  Here are some examples of exercises for you to try. The exercises may be suggested for a condition or for rehabilitation. Start each exercise slowly. Ease off the exercises if you start to have pain. You will be told when to start these exercises and which ones will work best for you. How to do the exercises  Press-up    1. Lie on your stomach, supporting your body with your forearms. 2. Press your elbows down into the floor to raise your upper back. As you do this, relax your stomach muscles and allow your back to arch without using your back muscles. As your press up, do not let your hips or pelvis come off the floor. 3. Hold for 15 to 30 seconds, then relax. 4. Repeat 2 to 4 times. Alternate arm and leg (bird dog) exercise    1. Start on the floor, on your hands and knees. 2. Tighten your belly muscles. 3. Raise one leg off the floor, and hold it straight out behind you. Be careful not to let your hip drop down, because that will twist your trunk. 4. Hold for about 6 seconds, then lower your leg and switch to the other leg. 5. Repeat 8 to 12 times on each leg. 6. Over time, work up to holding for 10 to 30 seconds each time. 7. If you feel stable and secure with your leg raised, try raising the opposite arm straight out in front of you at the same time. Knee-to-chest exercise    1. Lie on your back with your knees bent and your feet flat on the floor. 2. Bring one knee to your chest, keeping the other foot flat on the floor (or keeping the other leg straight, whichever feels better on your lower back). 3. Keep your lower back pressed to the floor. Hold for at least 15 to 30 seconds. 4. Relax, and lower the knee to the starting position. 5. Repeat with the other leg.  Repeat 2 to 4 times with each leg. 6. To get more stretch, put your other leg flat on the floor while pulling your knee to your chest.    Curl-ups    1. Lie on the floor on your back with your knees bent at a 90-degree angle. Your feet should be flat on the floor, about 12 inches from your buttocks. 2. Cross your arms over your chest. If this bothers your neck, try putting your hands behind your neck (not your head), with your elbows spread apart. 3. Slowly tighten your belly muscles and raise your shoulder blades off the floor. 4. Keep your head in line with your body, and do not press your chin to your chest.  5. Hold this position for 1 or 2 seconds, then slowly lower yourself back down to the floor. 6. Repeat 8 to 12 times. Pelvic tilt exercise    1. Lie on your back with your knees bent. 2. \"Brace\" your stomach. This means to tighten your muscles by pulling in and imagining your belly button moving toward your spine. You should feel like your back is pressing to the floor and your hips and pelvis are rocking back. 3. Hold for about 6 seconds while you breathe smoothly. 4. Repeat 8 to 12 times. Heel dig bridging    1. Lie on your back with both knees bent and your ankles bent so that only your heels are digging into the floor. Your knees should be bent about 90 degrees. 2. Then push your heels into the floor, squeeze your buttocks, and lift your hips off the floor until your shoulders, hips, and knees are all in a straight line. 3. Hold for about 6 seconds as you continue to breathe normally, and then slowly lower your hips back down to the floor and rest for up to 10 seconds. 4. Do 8 to 12 repetitions. Hamstring stretch in doorway    1. Lie on your back in a doorway, with one leg through the open door. 2. Slide your leg up the wall to straighten your knee. You should feel a gentle stretch down the back of your leg. 3. Hold the stretch for at least 15 to 30 seconds.  Do not arch your back, point your toes, or bend either knee. Keep one heel touching the floor and the other heel touching the wall. 4. Repeat with your other leg. 5. Do 2 to 4 times for each leg. Hip flexor stretch    1. Kneel on the floor with one knee bent and one leg behind you. Place your forward knee over your foot. Keep your other knee touching the floor. 2. Slowly push your hips forward until you feel a stretch in the upper thigh of your rear leg. 3. Hold the stretch for at least 15 to 30 seconds. Repeat with your other leg. 4. Do 2 to 4 times on each side. Wall sit    1. Stand with your back 10 to 12 inches away from a wall. 2. Lean into the wall until your back is flat against it. 3. Slowly slide down until your knees are slightly bent, pressing your lower back into the wall. 4. Hold for about 6 seconds, then slide back up the wall. 5. Repeat 8 to 12 times. Follow-up care is a key part of your treatment and safety. Be sure to make and go to all appointments, and call your doctor if you are having problems. It's also a good idea to know your test results and keep a list of the medicines you take. Where can you learn more? Go to http://kimberly-todd.info/. Enter M974 in the search box to learn more about \"Low Back Pain: Exercises. \"  Current as of: September 20, 2018  Content Version: 12.1  © 8465-9420 [employer]. Care instructions adapted under license by [employer] (which disclaims liability or warranty for this information). If you have questions about a medical condition or this instruction, always ask your healthcare professional. [address] any warranty or liability for your use of this information.

## 2019-08-02 NOTE — PROGRESS NOTES
[facility] Note  Subjective:      Lona Mendoza is a 19 y.o. male who presents for an acute visit with the following chief complaints. Chief Complaint   Patient presents with    Numbness     left leg x 2 days     Constipation     x 2 days        Reports recent history of prostatitis a few weeks ago. He had symptoms of increased frequency, dysuria discomfort with ejaculation and tightness of the area just behind scrotum. Evaluated at [facility] last week and was started on Bactrim antibiotic therapy. Reports STI testing was negative 1 week prior to onset of these symptoms at work clinic. Since this time he report improvement of urinary symptoms. He reports constipation. He was  [facility] for this with recommendations of Miralax  He took Miralax with prune juice with relief. But constipation occurred again this morning. Mild rectal discomfort with straining. 2 days ago he notes paresthesia of the left leg. Tingling of the left foot, and occasionally of quadricept. Yesterday he notes mild low back pain. Present currently, mild aching. Current Outpatient Medications   Medication Sig Dispense Refill    trimethoprim-sulfamethoxazole (BACTRIM DS, SEPTRA DS) 160-800 mg per tablet        No Known Allergies    ROS:   Complete review of systems was reviewed with pertinent information listed in HPI. Review of Systems   Constitutional: Negative for chills, diaphoresis, fever, malaise/fatigue and weight loss. HENT: Negative for congestion and sore throat. Eyes: Negative for blurred vision and double vision. Respiratory: Negative for cough. Cardiovascular: Negative for chest pain and palpitations. Gastrointestinal: Positive for constipation. Negative for abdominal pain, blood in stool, diarrhea, heartburn, melena, nausea and vomiting. Genitourinary: Negative for dysuria, flank pain, frequency, hematuria and urgency. Musculoskeletal: Positive for back pain. Negative for joint pain and myalgias. Skin: Negative for itching and rash. Neurological: Positive for tingling. Negative for dizziness, tremors, sensory change, speech change, focal weakness, weakness and headaches. Psychiatric/Behavioral: Negative. Objective:     Visit Vitals  /71 (BP 1 Location: Right arm, BP Patient Position: Sitting)   Pulse 80   Temp 97.6 °F (36.4 °C) (Oral)   Resp 16   Ht 5' 10\" (1.778 m)   Wt 131 lb (59.4 kg)   SpO2 95%   BMI 18.80 kg/m²       Vitals and Nurse Documentation reviewed. Physical Exam   Constitutional: He is oriented to person, place, and time and well-developed, well-nourished, and in no distress. HENT:   Head: Normocephalic and atraumatic. Eyes: Pupils are equal, round, and reactive to light. Conjunctivae and EOM are normal.   Neck: Normal range of motion. Neck supple. Carotid bruit is not present. Cardiovascular: Normal rate, regular rhythm, normal heart sounds and intact distal pulses. Exam reveals no gallop and no friction rub. No murmur heard. Pulmonary/Chest: Effort normal and breath sounds normal. No respiratory distress. He has no wheezes. He has no rales. He exhibits no tenderness. Abdominal: Soft. Normal appearance and bowel sounds are normal. He exhibits no distension and no mass. There is no hepatosplenomegaly. There is no tenderness. There is no rebound, no guarding and no CVA tenderness. No hernia. Hernia confirmed negative in the right inguinal area and confirmed negative in the left inguinal area. Genitourinary: Rectum normal. Rectal exam shows no external hemorrhoid, no internal hemorrhoid, no fissure, no laceration, no mass and no tenderness. Musculoskeletal: Normal range of motion. He exhibits no edema, tenderness or deformity.         Lumbar back: He exhibits normal range of motion, no tenderness, no bony tenderness, no swelling, no edema, no deformity, no laceration, no pain and no spasm. Back:    Negative straight leg raise bilaterally. No saddle anesthesia. Lymphadenopathy:     He has no cervical adenopathy. Neurological: He is alert and oriented to person, place, and time. He has normal sensation, normal strength, normal reflexes and intact cranial nerves. Gait normal. Gait normal.   Reflex Scores:       Patellar reflexes are 2+ on the right side and 2+ on the left side. Achilles reflexes are 2+ on the right side and 2+ on the left side. Skin: Skin is warm and dry. He is not diaphoretic. Psychiatric: Mood, memory, affect and judgment normal.   Nursing note and vitals reviewed. Assessment/Plan:     Diagnoses and all orders for this visit:    1. Acute midline low back pain with left-sided sciatica: Acute nontraumatic low back pain. No neurovascular deficits on exam. No redflags. Start conservative therapy, NSAID's, alternate heat/ice, home stretches. 2. Acute prostatitis: Diagn[facility] at [facility] week, clinically resolving. Continue antibiotic therapy. 3. Constipation, unspecified constipation type: Also an acute issue. Drink plenty of water. Take Miralax 1-2 times daily as needed for constipation. Diet and lifestyle modifications reviewed. RTC if symptoms worsen or do not resolve. Follow-up and Dispositions    · Return in about 6 weeks (around 9/13/2019), or if symptoms worsen or fail to improve.          Discussed expected course/resolution/complications of diagnosis in detail with patient.    Medication risks/benefits/costs/interactions/alternatives discussed with patient.    Pt was given an after visit summary which includes diagnoses, current medications & vitals.  Pt expressed understanding with the diagnosis and plan

## 2019-09-06 ENCOUNTER — OFFICE VISIT (OUTPATIENT)
Dept: FAMILY MEDICINE CLINIC | Age: 19
End: 2019-09-06

## 2019-09-06 VITALS
OXYGEN SATURATION: 98 % | TEMPERATURE: 98 F | WEIGHT: 133 LBS | HEART RATE: 87 BPM | DIASTOLIC BLOOD PRESSURE: 72 MMHG | RESPIRATION RATE: 16 BRPM | HEIGHT: 70 IN | SYSTOLIC BLOOD PRESSURE: 130 MMHG | BODY MASS INDEX: 19.04 KG/M2

## 2019-09-06 DIAGNOSIS — Z13.220 SCREENING, LIPID: ICD-10-CM

## 2019-09-06 DIAGNOSIS — R07.89 ATYPICAL CHEST PAIN: Primary | ICD-10-CM

## 2019-09-06 DIAGNOSIS — F41.1 GAD (GENERALIZED ANXIETY DISORDER): ICD-10-CM

## 2019-09-06 PROBLEM — N41.1 CHRONIC PROSTATITIS: Status: ACTIVE | Noted: 2019-09-06

## 2019-09-06 NOTE — PROGRESS NOTES
Chief Complaint   Patient presents with    Shortness of Breath     x 3-4 days     Chest Pain     pain with taking deep breath     Cough     dry      1. Have you been to the ER, urgent care clinic since your last visit? Hospitalized since your last visit? No    2. Have you seen or consulted any other health care providers outside of the [address] [place] [address] since your last visit? Include any pap smears or colon screening.  No

## 2019-09-06 NOTE — PROGRESS NOTES
Assessment/Plan:     Diagnoses and all orders for this visit:    1. Atypical chest pain  -     AMB POC EKG ROUTINE W/ 12 LEADS, INTER & REP  -     METABOLIC PANEL, COMPREHENSIVE  -     CBC WITH AUTOMATED DIFF  EKG without acute ischemic changes. Continue to monitor and follow-up if symptoms persist.  Labs pending. 2. Screening, lipid  -     LIPID PANEL    3. CLARISA (generalized anxiety disorder)  -     TSH 3RD GENERATION  I have offered counseling services which she declines at this time. CLARISA 7 equals 14. He states understanding that SSRI therapy is an option for treatment and he declines at this time. He will follow-up on an as-needed basis. Follow-up and Dispositions    · Return in about 1 year (around 9/6/2020) for Complete Physical.         Discussed expected course/resolution/complications of diagnosis in detail with patient. Medication risks/benefits/costs/interactions/alternatives discussed with patient. Pt was given after visit summary which includes diagnoses, current medications & vitals. Pt expressed understanding with the diagnosis and plan          Subjective:      Gianluca Friday is a 22 y.o. male who presents for had concerns including Shortness of Breath (x 3-4 days ); Chest Pain (pain with taking deep breath ); and Cough (dry ). \"Pamela\"    Reports a one week of shortness of breath and intermittent chest pain. Reports a one day history of cough. He is not currently taking any OTC treatment. He denies contacts with similar symptoms. Denies fever, chills, body aches. This is his first evaluation. He reports a history of generalized anxiety disorder. He has not previously attempted treatment. He feels symptoms are stable at this time. He is currently working at [employer]. He did some classes at [employer].          Patient Active Problem List   Diagnosis Code    Obsessive-compulsive disorder with good or fair insight F42.9    Generalized anxiety disorder F41.1    Chronic prostatitis N41.1       Current Outpatient Medications   Medication Sig Dispense Refill    trimethoprim-sulfamethoxazole (BACTRIM DS, SEPTRA DS) 160-800 mg per tablet          No Known Allergies    ROS:   Review of Systems   Constitutional: Negative for chills, fever and malaise/fatigue. HENT: Negative for congestion, ear pain, sinus pain and sore throat. Respiratory: Positive for cough and shortness of breath. Negative for sputum production and wheezing. Cardiovascular: Positive for chest pain. Neurological: Negative for seizures. Endo/Heme/Allergies: Negative for environmental allergies. Psychiatric/Behavioral: The patient is nervous/anxious. Objective:     Visit Vitals  /72   Pulse 87   Temp 98 °F (36.7 °C) (Oral)   Resp 16   Ht 5' 10\" (1.778 m)   Wt 133 lb (60.3 kg)   SpO2 98%   BMI 19.08 kg/m²       Vitals and Nurse Documentation reviewed. Physical Exam   Constitutional: No distress. HENT:   Right Ear: Tympanic membrane is not erythematous and not bulging. No middle ear effusion. Left Ear: Tympanic membrane is not erythematous and not bulging. No middle ear effusion. Nose: No rhinorrhea. Right sinus exhibits no maxillary sinus tenderness and no frontal sinus tenderness. Left sinus exhibits no maxillary sinus tenderness and no frontal sinus tenderness. Mouth/Throat: No oropharyngeal exudate or posterior oropharyngeal erythema. Eyes: EOM and lids are normal.   Cardiovascular: S1 normal and S2 normal. Exam reveals no gallop and no friction rub. No murmur heard. Pulmonary/Chest: Breath sounds normal. He has no wheezes. Lymphadenopathy:     He has no cervical adenopathy. Skin: Skin is warm and dry. Psychiatric: Affect normal. His mood appears anxious.

## 2019-09-07 LAB
ALBUMIN SERPL-MCNC: 4.9 G/DL (ref 3.5–5.5)
ALBUMIN/GLOB SERPL: 2.2 {RATIO} (ref 1.2–2.2)
ALP SERPL-CCNC: 75 IU/L (ref 39–117)
ALT SERPL-CCNC: 14 IU/L (ref 0–44)
AST SERPL-CCNC: 16 IU/L (ref 0–40)
BASOPHILS # BLD AUTO: 0 X10E3/UL (ref 0–0.2)
BASOPHILS NFR BLD AUTO: 1 %
BILIRUB SERPL-MCNC: 0.7 MG/DL (ref 0–1.2)
BUN SERPL-MCNC: 11 MG/DL (ref 6–20)
BUN/CREAT SERPL: 11 (ref 9–20)
CALCIUM SERPL-MCNC: 10.3 MG/DL (ref 8.7–10.2)
CHLORIDE SERPL-SCNC: 101 MMOL/L (ref 96–106)
CHOLEST SERPL-MCNC: 128 MG/DL (ref 100–199)
CO2 SERPL-SCNC: 23 MMOL/L (ref 20–29)
CREAT SERPL-MCNC: 1.01 MG/DL (ref 0.76–1.27)
EOSINOPHIL # BLD AUTO: 0.1 X10E3/UL (ref 0–0.4)
EOSINOPHIL NFR BLD AUTO: 1 %
ERYTHROCYTE [DISTWIDTH] IN BLOOD BY AUTOMATED COUNT: 12.2 % (ref 12.3–15.4)
GLOBULIN SER CALC-MCNC: 2.2 G/DL (ref 1.5–4.5)
GLUCOSE SERPL-MCNC: 84 MG/DL (ref 65–99)
HCT VFR BLD AUTO: 44.2 % (ref 37.5–51)
HDLC SERPL-MCNC: 45 MG/DL
HGB BLD-MCNC: 15.7 G/DL (ref 13–17.7)
IMM GRANULOCYTES # BLD AUTO: 0 X10E3/UL (ref 0–0.1)
IMM GRANULOCYTES NFR BLD AUTO: 0 %
INTERPRETATION, 910389: NORMAL
LDLC SERPL CALC-MCNC: 68 MG/DL (ref 0–99)
LYMPHOCYTES # BLD AUTO: 0.7 X10E3/UL (ref 0.7–3.1)
LYMPHOCYTES NFR BLD AUTO: 17 %
MCH RBC QN AUTO: 32 PG (ref 26.6–33)
MCHC RBC AUTO-ENTMCNC: 35.5 G/DL (ref 31.5–35.7)
MCV RBC AUTO: 90 FL (ref 79–97)
MONOCYTES # BLD AUTO: 0.6 X10E3/UL (ref 0.1–0.9)
MONOCYTES NFR BLD AUTO: 15 %
NEUTROPHILS # BLD AUTO: 2.7 X10E3/UL (ref 1.4–7)
NEUTROPHILS NFR BLD AUTO: 66 %
PLATELET # BLD AUTO: 240 X10E3/UL (ref 150–450)
POTASSIUM SERPL-SCNC: 4.4 MMOL/L (ref 3.5–5.2)
PROT SERPL-MCNC: 7.1 G/DL (ref 6–8.5)
RBC # BLD AUTO: 4.9 X10E6/UL (ref 4.14–5.8)
SODIUM SERPL-SCNC: 141 MMOL/L (ref 134–144)
TRIGL SERPL-MCNC: 75 MG/DL (ref 0–149)
TSH SERPL DL<=0.005 MIU/L-ACNC: 2.45 UIU/ML (ref 0.45–4.5)
VLDLC SERPL CALC-MCNC: 15 MG/DL (ref 5–40)
WBC # BLD AUTO: 4.1 X10E3/UL (ref 3.4–10.8)

## 2020-06-16 ENCOUNTER — VIRTUAL VISIT (OUTPATIENT)
Dept: FAMILY MEDICINE CLINIC | Age: 20
End: 2020-06-16

## 2020-06-16 DIAGNOSIS — S61.209D FINGER WOUND, SIMPLE, OPEN, SUBSEQUENT ENCOUNTER: Primary | ICD-10-CM

## 2020-06-16 DIAGNOSIS — F42.9 OBSESSIVE-COMPULSIVE DISORDER WITH GOOD OR FAIR INSIGHT: Chronic | ICD-10-CM

## 2020-06-16 DIAGNOSIS — F41.1 GENERALIZED ANXIETY DISORDER: ICD-10-CM

## 2020-06-16 NOTE — PROGRESS NOTES
HISTORY OF PRESENT ILLNESS  HPI  Tania Bloch is a 22 y.o. male with a history of prostatitis, OCD and CLARISA. Pt is seen today through virtual visit. Pt recently cut his right index finger when cutting a lime about 3 days ago on Saturday night. He went to the ER at [address] where he says they glued parts of his finger. Pt mentions being given a tetanus shot while at the hospital. He endorses some lingering numbness in parts of the finger but denies any real soreness in the finger. Pt denies unusual SOB, chest pain, and any recent ER visits or hospitalizations. Consent: Tania Bloch, who was seen by synchronous (real-time) audio-video technology, and/or his healthcare decision maker, is aware that this patient-initiated, Telehealth encounter on 6/16/2020 is a billable service, with coverage as determined by his insurance carrier. He is aware that he may receive a bill and has provided verbal consent to proceed: Yes. Tania Bloch is a 22 y.o. male who was evaluated by an audio-video encounter for concerns as above. Patient identification was verified prior to start of the visit. A caregiver was present when appropriate. Due to this being a TeleHealth encounter (During [facility]-20 public health emergency), evaluation of the following organ systems was limited: Vitals/Constitutional/EENT/Resp/CV/GI//MS/Neuro/Skin/Heme-Lymph-Imm. Pursuant to the emergency declaration under the [facility][note 3][facility], 1135 waiver authority and the [employer] Act, this Virtual Visit was conducted, with patient's (and/or legal guardian's) consent, to reduce the patient's risk of exposure to COVID-19 and provide necessary medical care. Services were provided through a synchronous discussion virtually to substitute for in-person clinic visit. I was in the office. The patient was at home.         Past Medical History:   Diagnosis Date    Generalized anxiety disorder 7/19/2019    Obsessive-compulsive disorder with good or fair insight 12/22/2016    OCD (obsessive compulsive disorder)     Prostatitis 2019     History reviewed. No pertinent surgical history. Current Outpatient Medications on File Prior to Visit   Medication Sig Dispense Refill    [DISCONTINUED] trimethoprim-sulfamethoxazole (BACTRIM DS, SEPTRA DS) 160-800 mg per tablet        No current facility-administered medications on file prior to visit. No Known Allergies  Family History   Problem Relation Age of Onset    No Known Problems Mother     Diabetes Father     No Known Problems Brother     Prostate Cancer Paternal Great Grandfather      Social History     Socioeconomic History    Marital status: SINGLE     Spouse name: Not on file    Number of children: Not on file    Years of education: Not on file    Highest education level: Not on file   Occupational History    Occupation: Collections at [employer]   Tobacco Use    Smoking status: Former Smoker     Years: 1.00     Start date: 8/30/2019    Smokeless tobacco: Never Used    Tobacco comment: Vap/E-Cigs ~ a few pods a week   Substance and Sexual Activity    Alcohol use: No    Drug use: No    Sexual activity: Yes     Partners: Female     Birth control/protection: Condom   Other Topics Concern    Caffeine Concern Yes     Comment: 2 bottles of 20 oz Dr. Petey Bell per day             Review of Systems   Constitutional: Negative for chills, diaphoresis, fever, malaise/fatigue and weight loss. Eyes: Negative for blurred vision, double vision, pain and redness. Respiratory: Negative for cough, shortness of breath and wheezing. Cardiovascular: Negative for chest pain, palpitations, orthopnea, claudication, leg swelling and PND. Skin: Negative for itching and rash.    Neurological: Negative for dizziness, tingling, tremors, sensory change, speech change, focal weakness, seizures, loss of consciousness, weakness and headaches. Results for orders placed or performed in visit on 23/41/74   METABOLIC PANEL, COMPREHENSIVE   Result Value Ref Range    Glucose 84 65 - 99 mg/dL    BUN 11 6 - 20 mg/dL    Creatinine 1.01 0.76 - 1.27 mg/dL    GFR est non- >59 mL/min/1.73    GFR est  >59 mL/min/1.73    BUN/Creatinine ratio 11 9 - 20    Sodium 141 134 - 144 mmol/L    Potassium 4.4 3.5 - 5.2 mmol/L    Chloride 101 96 - 106 mmol/L    CO2 23 20 - 29 mmol/L    Calcium 10.3 (H) 8.7 - 10.2 mg/dL    Protein, total 7.1 6.0 - 8.5 g/dL    Albumin 4.9 3.5 - 5.5 g/dL    GLOBULIN, TOTAL 2.2 1.5 - 4.5 g/dL    A-G Ratio 2.2 1.2 - 2.2    Bilirubin, total 0.7 0.0 - 1.2 mg/dL    Alk. phosphatase 75 39 - 117 IU/L    AST (SGOT) 16 0 - 40 IU/L    ALT (SGPT) 14 0 - 44 IU/L   CBC WITH AUTOMATED DIFF   Result Value Ref Range    WBC 4.1 3.4 - 10.8 x10E3/uL    RBC 4.90 4.14 - 5.80 x10E6/uL    HGB 15.7 13.0 - 17.7 g/dL    HCT 44.2 37.5 - 51.0 %    MCV 90 79 - 97 fL    MCH 32.0 26.6 - 33.0 pg    MCHC 35.5 31.5 - 35.7 g/dL    RDW 12.2 (L) 12.3 - 15.4 %    PLATELET 193 107 - 556 x10E3/uL    NEUTROPHILS 66 Not Estab. %    Lymphocytes 17 Not Estab. %    MONOCYTES 15 Not Estab. %    EOSINOPHILS 1 Not Estab. %    BASOPHILS 1 Not Estab. %    ABS. NEUTROPHILS 2.7 1.4 - 7.0 x10E3/uL    Abs Lymphocytes 0.7 0.7 - 3.1 x10E3/uL    ABS. MONOCYTES 0.6 0.1 - 0.9 x10E3/uL    ABS. EOSINOPHILS 0.1 0.0 - 0.4 x10E3/uL    ABS. BASOPHILS 0.0 0.0 - 0.2 x10E3/uL    IMMATURE GRANULOCYTES 0 Not Estab. %    ABS. IMM.  GRANS. 0.0 0.0 - 0.1 x10E3/uL   LIPID PANEL   Result Value Ref Range    Cholesterol, total 128 100 - 199 mg/dL    Triglyceride 75 0 - 149 mg/dL    HDL Cholesterol 45 >39 mg/dL    VLDL, calculated 15 5 - 40 mg/dL    LDL, calculated 68 0 - 99 mg/dL   TSH 3RD GENERATION   Result Value Ref Range    TSH 2.450 0.450 - 4.500 uIU/mL   CVD REPORT   Result Value Ref Range    INTERPRETATION Note          Physical Exam  There were no vitals taken for this visit. General: alert, cooperative, no distress   Mental  status: normal mood, behavior, speech, dress, motor activity, and thought processes, able to follow commands   HENT: NCAT   Neck: no visualized mass   Resp: no respiratory distress   Neuro: no gross deficits   Skin: no discoloration or lesions of concern on visible areas   Psychiatric: normal affect, consistent with stated mood, no evidence of hallucinations       ASSESSMENT and PLAN    ICD-10-CM ICD-9-CM    1. Finger wound, simple, open, subsequent encounter S61.209D V58.89      883.0    2. Generalized anxiety disorder F41.1 300.02    3. Obsessive-compulsive disorder with good or fair insight F42.9 300.3      Diagnoses and all orders for this visit:    1. Finger wound, simple, open, subsequent encounter    2. Generalized anxiety disorder    3. Obsessive-compulsive disorder with good or fair insight      Follow-up and Dispositions    · Return if symptoms worsen or fail to improve. reviewed medications and side effects in detail  Please call my office if there are any questions- [phone]. Discussed expected course/resolution/complications of diagnosis in detail with patient. Medication risks/benefits/costs/interactions/alternatives discussed with patient. Pt was given an after visit summary which includes diagnoses, current medications & vitals. Pt expressed understanding with the diagnosis and plan. Patient to call if no better in 3 -4 days and prn new problems. Encouraged pt to not get the wound wet significantly until about 10 days, and to gradually do stretching exercises to heal it as the wound is on the palmar aspect of the index finger. We will check to see if it was a tdap or not. He should call if he has any increasing tenderness, as this would be a sign of infection at this point.     This document was written by Deshawn Ordonez, as dictated by Rj Parker MD.  I have reviewed and agree with the above note and have made corrections where appropriate Nilo Metz M.D.

## 2020-06-16 NOTE — PROGRESS NOTES
Chief Complaint   Patient presents with    Finger Pain     cut finger with knife seen in ER covered it with glue. feels numb. 1. Have you been to the ER, urgent care clinic since your last visit? Hospitalized since your last visit? Yes Reason for visit: finger injury at [facility]    2. Have you seen or consulted any other health care providers outside of the [address] [place] [address] since your last visit? Include any pap smears or colon screening.  No

## 2020-12-27 LAB — SARS-COV-2, NAA: POSITIVE

## 2021-08-04 ENCOUNTER — TELEPHONE (OUTPATIENT)
Dept: FAMILY MEDICINE CLINIC | Age: 21
End: 2021-08-04

## 2021-08-04 NOTE — TELEPHONE ENCOUNTER
Patient states for the past year he has been seeing [facility]rology for bladder issues. Now having changes in bm.  Transferred up front to schedule with available provider

## 2021-08-04 NOTE — TELEPHONE ENCOUNTER
Pt said he has been experiencing rectal discomfort, changes in bowel movement.      BCB# [phone]

## 2021-08-05 ENCOUNTER — VIRTUAL VISIT (OUTPATIENT)
Dept: FAMILY MEDICINE CLINIC | Age: 21
End: 2021-08-05
Payer: COMMERCIAL

## 2021-08-05 DIAGNOSIS — K52.1 ANTIBIOTIC-ASSOCIATED DIARRHEA: ICD-10-CM

## 2021-08-05 DIAGNOSIS — T36.95XA ANTIBIOTIC-ASSOCIATED DIARRHEA: ICD-10-CM

## 2021-08-05 DIAGNOSIS — K58.2 IRRITABLE BOWEL SYNDROME WITH BOTH CONSTIPATION AND DIARRHEA: Primary | ICD-10-CM

## 2021-08-05 PROCEDURE — 99213 OFFICE O/P EST LOW 20 MIN: CPT | Performed by: FAMILY MEDICINE

## 2021-08-05 RX ORDER — SAME BUTANEDISULFONATE/BETAINE 400-600 MG
250 POWDER IN PACKET (EA) ORAL 2 TIMES DAILY
Qty: 28 CAPSULE | Refills: 0 | Status: SHIPPED | OUTPATIENT
Start: 2021-08-05 | End: 2021-08-19

## 2021-08-05 NOTE — PROGRESS NOTES
Franco Apley is a 23 y.o. male who was seen by synchronous (real-time) audio-video technology on 8/5/2021 for Diarrhea        Assessment & Plan:   Diagnoses and all orders for this visit:    1. Irritable bowel syndrome with both constipation and diarrhea  -     Saccharomyces boulardii (FLORASTOR) 250 mg capsule; Take 1 Capsule by mouth two (2) times a day for 14 days. 2. Antibiotic-associated diarrhea  -     Saccharomyces boulardii (FLORASTOR) 250 mg capsule; Take 1 Capsule by mouth two (2) times a day for 14 days. Reviewed all previous records from [address] [place] [address] and [facility]. Patient with underlying anxiety and OCD and discussed possibility and high chances of IBS. Also discussed possibility of antibiotic associated diarrhea as his symptoms started after course of antibiotic for prostatitis. Strongly recommended to follow high-fiber diet and to take probiotic. If symptoms do not improve, will do more work-up for inflammatory bowel disease. Subjective:      Irritable Bowel Syndrome  Patient complains of abdominal cramping, loose stools. Onset of symptoms was several months ago. He describes symptoms as loose stools occurring a few times per day  constipation alternating with loose stools. anxiety, currently moderate. Patient denies difficulty swallowing, weight loss, melena, hematochezia, fever, sweats, pelvic pain, urinary symptoms, dizziness or lightheaded feeling. Course to date has been symptoms have progressed to a point and plateaued. . Previous visits for this problem: none. Evaluation to date has been patient had a colonoscopy last year and was informed that everything was normal.. Treatment to date has been patient is taking prune juice for constipation. .    Patient has history of prostatitis and following urology. Has been on antibiotics for few times. Lastly he had doxycycline. His brother has diagnosis of ulcerative colitis.   Prior to Admission medications    Medication Sig Start Date End Date Taking? Authorizing Provider   Saccharomyces boulardii (FLORASTOR) 250 mg capsule Take 1 Capsule by mouth two (2) times a day for 14 days. 8/5/21 8/19/21 Yes Ender Awan MD     Patient Active Problem List    Diagnosis Date Noted    Chronic prostatitis 09/06/2019    Generalized anxiety disorder 07/19/2019    Obsessive-compulsive disorder with good or fair insight 12/22/2016     Current Outpatient Medications   Medication Sig Dispense Refill    Saccharomyces boulardii (FLORASTOR) 250 mg capsule Take 1 Capsule by mouth two (2) times a day for 14 days. 28 Capsule 0     No Known Allergies  Past Medical History:   Diagnosis Date    Generalized anxiety disorder 7/19/2019    Obsessive-compulsive disorder with good or fair insight 12/22/2016    OCD (obsessive compulsive disorder)     Prostatitis 2019     No past surgical history on file. Family History   Problem Relation Age of Onset    No Known Problems Mother     Diabetes Father     No Known Problems Brother     Prostate Cancer Paternal Great Grandfather      Social History     Tobacco Use    Smoking status: Former Smoker     Years: 1.00     Start date: 8/30/2019    Smokeless tobacco: Never Used    Tobacco comment: Vap/E-Cigs ~ a few pods a week   Substance Use Topics    Alcohol use: No       Review of Systems   Constitutional: Negative for chills, fever and malaise/fatigue. HENT: Negative for congestion, ear pain, sore throat and tinnitus. Eyes: Negative for blurred vision, double vision, pain and discharge. Respiratory: Negative for cough, shortness of breath and wheezing. Cardiovascular: Negative for chest pain, palpitations and leg swelling. Gastrointestinal: Positive for diarrhea. Negative for abdominal pain, blood in stool, constipation, nausea and vomiting. Mushy and loose stool alternating with constipation   Genitourinary: Negative for dysuria, frequency, hematuria and urgency.    Musculoskeletal: Negative for back pain, joint pain and myalgias. Skin: Negative for rash. Neurological: Negative for dizziness, tremors, seizures and headaches. Endo/Heme/Allergies: Negative for polydipsia. Does not bruise/bleed easily. Psychiatric/Behavioral: Negative for depression and substance abuse. The patient is not nervous/anxious. Objective:   No flowsheet data found. [INSTRUCTIONS:  \"[x]\" Indicates a positive item  \"[]\" Indicates a negative item  -- DELETE ALL ITEMS NOT EXAMINED]    Constitutional: [x] Appears well-developed and well-nourished [x] No apparent distress      [] Abnormal -     Mental status: [x] Alert and awake  [x] Oriented to person/place/time [x] Able to follow commands    [] Abnormal -     Eyes:   EOM    [x]  Normal    [] Abnormal -   Sclera  [x]  Normal    [] Abnormal -          Discharge [x]  None visible   [] Abnormal -     HENT: [x] Normocephalic, atraumatic  [] Abnormal -   [x] Mouth/Throat: Mucous membranes are moist    External Ears [x] Normal  [] Abnormal -    Neck: [x] No visualized mass [] Abnormal -     Pulmonary/Chest: [x] Respiratory effort normal   [x] No visualized signs of difficulty breathing or respiratory distress        [] Abnormal -      Musculoskeletal:   [x] Normal gait with no signs of ataxia         [x] Normal range of motion of neck        [] Abnormal -     Neurological:        [x] No Facial Asymmetry (Cranial nerve 7 motor function) (limited exam due to video visit)          [x] No gaze palsy        [] Abnormal -          Skin:        [x] No significant exanthematous lesions or discoloration noted on facial skin         [] Abnormal -            Psychiatric:       [x] Normal Affect [] Abnormal -        [x] No Hallucinations  Patient continuously moving during video visit    Other pertinent observable physical exam findings:-        We discussed the expected course, resolution and complications of the diagnosis(es) in detail.   Medication risks, benefits, costs, interactions, and alternatives were discussed as indicated. I advised him to contact the office if his condition worsens, changes or fails to improve as anticipated. He expressed understanding with the diagnosis(es) and plan. Sudhakar Bond, was evaluated through a synchronous (real-time) audio-video encounter. The patient (or guardian if applicable) is aware that this is a billable service. Verbal consent to proceed has been obtained within the past 12 months. The visit was conducted pursuant to the emergency declaration under the [address] [place] [address] authority and the [employer] and [employer] Act. Patient identification was verified, and a caregiver was present when appropriate. The patient was located in a state where the provider was credentialed to provide care.       Sukhjinder Muñoz MD

## 2022-03-19 PROBLEM — F41.1 GENERALIZED ANXIETY DISORDER: Status: ACTIVE | Noted: 2019-07-19

## 2022-03-19 PROBLEM — N41.1 CHRONIC PROSTATITIS: Status: ACTIVE | Noted: 2019-09-06
